# Patient Record
Sex: FEMALE | Race: WHITE | Employment: UNEMPLOYED | URBAN - METROPOLITAN AREA
[De-identification: names, ages, dates, MRNs, and addresses within clinical notes are randomized per-mention and may not be internally consistent; named-entity substitution may affect disease eponyms.]

---

## 2018-09-20 ENCOUNTER — HOSPITAL ENCOUNTER (EMERGENCY)
Age: 43
Discharge: HOME | End: 2018-09-20
Payer: COMMERCIAL

## 2018-09-20 ENCOUNTER — APPOINTMENT (OUTPATIENT)
Dept: CT IMAGING | Age: 43
End: 2018-09-20
Payer: COMMERCIAL

## 2018-09-20 VITALS
WEIGHT: 117 LBS | HEART RATE: 66 BPM | DIASTOLIC BLOOD PRESSURE: 80 MMHG | SYSTOLIC BLOOD PRESSURE: 132 MMHG | TEMPERATURE: 98.4 F | BODY MASS INDEX: 19.97 KG/M2 | OXYGEN SATURATION: 99 % | HEIGHT: 64 IN | RESPIRATION RATE: 16 BRPM

## 2018-09-20 DIAGNOSIS — S80.211A ABRASION, RIGHT KNEE, INITIAL ENCOUNTER: ICD-10-CM

## 2018-09-20 DIAGNOSIS — Z23 NEED FOR TETANUS BOOSTER: ICD-10-CM

## 2018-09-20 DIAGNOSIS — S50.01XA CONTUSION OF RIGHT ELBOW, INITIAL ENCOUNTER: ICD-10-CM

## 2018-09-20 DIAGNOSIS — S00.83XA FACIAL CONTUSION, INITIAL ENCOUNTER: Primary | ICD-10-CM

## 2018-09-20 PROCEDURE — 72125 CT NECK SPINE W/O DYE: CPT

## 2018-09-20 PROCEDURE — 6360000002 HC RX W HCPCS: Performed by: NURSE PRACTITIONER

## 2018-09-20 PROCEDURE — 70486 CT MAXILLOFACIAL W/O DYE: CPT

## 2018-09-20 PROCEDURE — 90715 TDAP VACCINE 7 YRS/> IM: CPT | Performed by: NURSE PRACTITIONER

## 2018-09-20 PROCEDURE — 70450 CT HEAD/BRAIN W/O DYE: CPT

## 2018-09-20 PROCEDURE — 90471 IMMUNIZATION ADMIN: CPT | Performed by: NURSE PRACTITIONER

## 2018-09-20 PROCEDURE — 99284 EMERGENCY DEPT VISIT MOD MDM: CPT

## 2018-09-20 RX ADMIN — TETANUS TOXOID, REDUCED DIPHTHERIA TOXOID AND ACELLULAR PERTUSSIS VACCINE, ADSORBED 0.5 ML: 5; 2.5; 8; 8; 2.5 SUSPENSION INTRAMUSCULAR at 13:20

## 2018-09-20 ASSESSMENT — PAIN DESCRIPTION - PAIN TYPE: TYPE: ACUTE PAIN

## 2018-09-20 ASSESSMENT — PAIN SCALES - GENERAL: PAINLEVEL_OUTOF10: 1

## 2018-09-20 NOTE — ED PROVIDER NOTES
corporate care. Discussed reasons for return. Counseling: The emergency provider has spoken with the patient and discussed todays results, in addition to providing specific details for the plan of care and counseling regarding the diagnosis and prognosis. Questions are answered at this time and they are agreeable with the plan. Assessment      1. Facial contusion, initial encounter    2. Contusion of right elbow, initial encounter    3. Abrasion, right knee, initial encounter    4. Need for tetanus booster      Plan   Discharge to home  Patient condition is stable    New Medications     New Prescriptions    No medications on file     Electronically signed by NATHALY Church CNP   DD: 9/20/18  **This report was transcribed using voice recognition software. Every effort was made to ensure accuracy; however, inadvertent computerized transcription errors may be present.   END OF ED PROVIDER NOTE       NATHALY Webster CNP  09/20/18 6000

## 2018-09-20 NOTE — ED TRIAGE NOTES
Pt reports to ER with an abrasion & swelling on chin, R elbow small abrasion, R knee abrasion. Pt reports she tripped & fell on a cement picnic table. No blood thinners, no LOC.    Pt is A&OX4, skin warm & dry, respirations even & unlabored

## 2019-03-15 ENCOUNTER — OFFICE VISIT (OUTPATIENT)
Dept: FAMILY MEDICINE CLINIC | Age: 44
End: 2019-03-15
Payer: COMMERCIAL

## 2019-03-15 VITALS
TEMPERATURE: 97.7 F | HEART RATE: 93 BPM | OXYGEN SATURATION: 98 % | WEIGHT: 112 LBS | RESPIRATION RATE: 18 BRPM | DIASTOLIC BLOOD PRESSURE: 70 MMHG | HEIGHT: 64 IN | BODY MASS INDEX: 19.12 KG/M2 | SYSTOLIC BLOOD PRESSURE: 112 MMHG

## 2019-03-15 DIAGNOSIS — J01.90 ACUTE SINUSITIS, RECURRENCE NOT SPECIFIED, UNSPECIFIED LOCATION: ICD-10-CM

## 2019-03-15 DIAGNOSIS — J40 BRONCHITIS: Primary | ICD-10-CM

## 2019-03-15 LAB
INFLUENZA A ANTIBODY: NEGATIVE
INFLUENZA B ANTIBODY: NEGATIVE

## 2019-03-15 PROCEDURE — 87804 INFLUENZA ASSAY W/OPTIC: CPT | Performed by: FAMILY MEDICINE

## 2019-03-15 PROCEDURE — 99213 OFFICE O/P EST LOW 20 MIN: CPT | Performed by: FAMILY MEDICINE

## 2019-03-15 RX ORDER — GUAIFENESIN/DEXTROMETHORPHAN 100-10MG/5
10 SYRUP ORAL 3 TIMES DAILY PRN
Qty: 240 ML | Refills: 3 | Status: SHIPPED | OUTPATIENT
Start: 2019-03-15 | End: 2019-03-25

## 2019-03-15 RX ORDER — FLUTICASONE FUROATE AND VILANTEROL 200; 25 UG/1; UG/1
200 POWDER RESPIRATORY (INHALATION) DAILY
Qty: 1 EACH | Refills: 0 | Status: SHIPPED | COMMUNITY
Start: 2019-03-15 | End: 2019-07-31 | Stop reason: ALTCHOICE

## 2019-03-15 RX ORDER — FLUTICASONE FUROATE AND VILANTEROL 200; 25 UG/1; UG/1
200 POWDER RESPIRATORY (INHALATION) DAILY
Qty: 1 EACH | Refills: 0 | Status: SHIPPED | OUTPATIENT
Start: 2019-03-15 | End: 2019-03-15 | Stop reason: DRUGHIGH

## 2019-03-15 RX ORDER — METRONIDAZOLE 500 MG/1
TABLET ORAL
Refills: 0 | COMMUNITY
Start: 2019-03-08 | End: 2019-07-31 | Stop reason: ALTCHOICE

## 2019-03-15 RX ORDER — FLUTICASONE PROPIONATE 50 MCG
1 SPRAY, SUSPENSION (ML) NASAL DAILY
Qty: 1 BOTTLE | Refills: 3 | Status: SHIPPED | OUTPATIENT
Start: 2019-03-15 | End: 2019-07-31 | Stop reason: ALTCHOICE

## 2019-03-15 RX ORDER — METHYLPREDNISOLONE 4 MG/1
TABLET ORAL
Qty: 1 KIT | Refills: 0 | Status: SHIPPED | OUTPATIENT
Start: 2019-03-15 | End: 2019-03-21

## 2019-03-15 ASSESSMENT — ENCOUNTER SYMPTOMS
COLOR CHANGE: 0
STRIDOR: 0
EYE DISCHARGE: 0
CHOKING: 0
EYE PAIN: 0
ABDOMINAL DISTENTION: 0
CHEST TIGHTNESS: 0
ALLERGIC/IMMUNOLOGIC NEGATIVE: 1
VOICE CHANGE: 0
TROUBLE SWALLOWING: 0
SWOLLEN GLANDS: 0
HOARSE VOICE: 0
SHORTNESS OF BREATH: 0
PHOTOPHOBIA: 0
APNEA: 0
EYE ITCHING: 0
BACK PAIN: 0
BLOOD IN STOOL: 0
RECTAL PAIN: 0
SORE THROAT: 1
COUGH: 1
ANAL BLEEDING: 0
SINUS PRESSURE: 1
CONSTIPATION: 0
GASTROINTESTINAL NEGATIVE: 1
EYE REDNESS: 0
FACIAL SWELLING: 0

## 2019-03-15 ASSESSMENT — PATIENT HEALTH QUESTIONNAIRE - PHQ9
1. LITTLE INTEREST OR PLEASURE IN DOING THINGS: 0
2. FEELING DOWN, DEPRESSED OR HOPELESS: 0
SUM OF ALL RESPONSES TO PHQ QUESTIONS 1-9: 0
SUM OF ALL RESPONSES TO PHQ9 QUESTIONS 1 & 2: 0
SUM OF ALL RESPONSES TO PHQ QUESTIONS 1-9: 0

## 2019-07-31 ENCOUNTER — HOSPITAL ENCOUNTER (OUTPATIENT)
Age: 44
Discharge: HOME | End: 2019-08-02
Payer: COMMERCIAL

## 2019-07-31 ENCOUNTER — OFFICE VISIT (OUTPATIENT)
Dept: FAMILY MEDICINE CLINIC | Age: 44
End: 2019-07-31

## 2019-07-31 VITALS
BODY MASS INDEX: 19.26 KG/M2 | DIASTOLIC BLOOD PRESSURE: 72 MMHG | TEMPERATURE: 97.4 F | OXYGEN SATURATION: 98 % | HEART RATE: 66 BPM | SYSTOLIC BLOOD PRESSURE: 110 MMHG | HEIGHT: 64 IN | WEIGHT: 112.8 LBS | RESPIRATION RATE: 18 BRPM

## 2019-07-31 DIAGNOSIS — R53.83 FATIGUE, UNSPECIFIED TYPE: ICD-10-CM

## 2019-07-31 DIAGNOSIS — R73.01 IFG (IMPAIRED FASTING GLUCOSE): ICD-10-CM

## 2019-07-31 DIAGNOSIS — E78.5 DYSLIPIDEMIA: ICD-10-CM

## 2019-07-31 DIAGNOSIS — J01.90 ACUTE BACTERIAL SINUSITIS: Primary | ICD-10-CM

## 2019-07-31 DIAGNOSIS — B96.89 ACUTE BACTERIAL SINUSITIS: Primary | ICD-10-CM

## 2019-07-31 DIAGNOSIS — B96.89 ACUTE BACTERIAL SINUSITIS: ICD-10-CM

## 2019-07-31 DIAGNOSIS — J01.90 ACUTE BACTERIAL SINUSITIS: ICD-10-CM

## 2019-07-31 DIAGNOSIS — Q30.9 NOSE ANOMALY: ICD-10-CM

## 2019-07-31 PROCEDURE — 87070 CULTURE OTHR SPECIMN AEROBIC: CPT

## 2019-07-31 PROCEDURE — 99213 OFFICE O/P EST LOW 20 MIN: CPT | Performed by: FAMILY MEDICINE

## 2019-07-31 RX ORDER — METHYLPREDNISOLONE 4 MG/1
TABLET ORAL
Qty: 1 KIT | Refills: 0 | Status: SHIPPED | OUTPATIENT
Start: 2019-07-31 | End: 2019-08-06

## 2019-07-31 RX ORDER — GUAIFENESIN 600 MG/1
600 TABLET, EXTENDED RELEASE ORAL 2 TIMES DAILY
Qty: 30 TABLET | Refills: 0 | Status: SHIPPED | OUTPATIENT
Start: 2019-07-31 | End: 2019-08-15

## 2019-07-31 RX ORDER — AMOXICILLIN AND CLAVULANATE POTASSIUM 875; 125 MG/1; MG/1
1 TABLET, FILM COATED ORAL 2 TIMES DAILY
Qty: 14 TABLET | Refills: 0 | Status: SHIPPED | OUTPATIENT
Start: 2019-07-31 | End: 2019-08-07

## 2019-07-31 ASSESSMENT — ENCOUNTER SYMPTOMS
COLOR CHANGE: 0
DIARRHEA: 0
TROUBLE SWALLOWING: 0
SINUS PRESSURE: 1
RHINORRHEA: 0
CHOKING: 0
EYE REDNESS: 0
BACK PAIN: 0
SHORTNESS OF BREATH: 0
FACIAL SWELLING: 0
ABDOMINAL PAIN: 0
ALLERGIC/IMMUNOLOGIC NEGATIVE: 1
VOICE CHANGE: 0
APNEA: 0
GASTROINTESTINAL NEGATIVE: 1
RECTAL PAIN: 0
WHEEZING: 0
ANAL BLEEDING: 0
PHOTOPHOBIA: 0
STRIDOR: 0
VOMITING: 0
EYE DISCHARGE: 0
COUGH: 0
CONSTIPATION: 0
CHEST TIGHTNESS: 0
NAUSEA: 0
ABDOMINAL DISTENTION: 0
EYE ITCHING: 0
BLOOD IN STOOL: 0
EYE PAIN: 0
SINUS PAIN: 1

## 2019-07-31 NOTE — PROGRESS NOTES
enuresis, flank pain, frequency, genital sores, hematuria, menstrual problem, pelvic pain, urgency, vaginal bleeding, vaginal discharge and vaginal pain. Musculoskeletal: Negative. Negative for arthralgias, back pain, gait problem, joint swelling, myalgias, neck pain and neck stiffness. Skin: Negative. Negative for color change, pallor, rash and wound. Allergic/Immunologic: Negative. Negative for environmental allergies, food allergies and immunocompromised state. Neurological: Negative. Negative for dizziness, tremors, seizures, syncope, facial asymmetry, speech difficulty, weakness, light-headedness, numbness and headaches. Hematological: Negative. Negative for adenopathy. Does not bruise/bleed easily. Psychiatric/Behavioral: Negative. Negative for agitation, behavioral problems, confusion, decreased concentration, dysphoric mood, hallucinations, self-injury, sleep disturbance and suicidal ideas. The patient is not nervous/anxious and is not hyperactive. Past Medical/Surgical Hx;  Reviewed with patient  History reviewed. No pertinent past medical history. Past Surgical History:   Procedure Laterality Date    BREAST LUMPECTOMY      BUNIONECTOMY         Past Family Hx:  Reviewed with patient  History reviewed. No pertinent family history. Social Hx:  Reviewed with patient  Social History     Tobacco Use    Smoking status: Never Smoker    Smokeless tobacco: Never Used   Substance Use Topics    Alcohol use: No       OBJECTIVE  /72   Pulse 66   Temp 97.4 °F (36.3 °C)   Resp 18   Ht 5' 3.5\" (1.613 m)   Wt 112 lb 12.8 oz (51.2 kg)   LMP 07/17/2019 (Approximate)   SpO2 98%   Breastfeeding? No   BMI 19.67 kg/m²     Problem List:  Jossy Razo does not have any pertinent problems on file. PHYS EX:  Physical Exam   Constitutional: She is oriented to person, place, and time. She appears well-developed and well-nourished. No distress. HENT:   Head: Normocephalic and atraumatic. Right Ear: External ear normal.   Left Ear: External ear normal.   Nose: Nose normal.   Mouth/Throat: Oropharynx is clear and moist. No oropharyngeal exudate. Sinus congestion with a post nasal drip  Swelling of her nasal bridge  Erythema to both nostrils , mucosa    Eyes: Pupils are equal, round, and reactive to light. Conjunctivae and EOM are normal. Right eye exhibits no discharge. Left eye exhibits no discharge. No scleral icterus. Neck: Normal range of motion. Neck supple. No JVD present. No tracheal deviation present. No thyromegaly present. Cardiovascular: Normal rate, regular rhythm and normal heart sounds. Exam reveals no gallop and no friction rub. No murmur heard. Pulmonary/Chest: Effort normal and breath sounds normal. No stridor. No respiratory distress. She has no wheezes. She has no rales. She exhibits no tenderness. Abdominal: Soft. Bowel sounds are normal. She exhibits no distension and no mass. There is no tenderness. There is no rebound and no guarding. No hernia. Musculoskeletal: Normal range of motion. She exhibits no edema, tenderness or deformity. Lymphadenopathy:     She has no cervical adenopathy. Neurological: She is alert and oriented to person, place, and time. She has normal reflexes. She displays normal reflexes. No cranial nerve deficit or sensory deficit. She exhibits normal muscle tone. Coordination normal.   Skin: Skin is warm. No rash noted. She is not diaphoretic. No erythema. No pallor. Psychiatric: She has a normal mood and affect. Her behavior is normal. Judgment and thought content normal.   Nursing note and vitals reviewed. ASSESSMENT/PLAN  Judd was seen today for facial swelling. Diagnoses and all orders for this visit:    Acute bacterial sinusitis  -     amoxicillin-clavulanate (AUGMENTIN) 875-125 MG per tablet; Take 1 tablet by mouth 2 times daily for 7 days  -     guaiFENesin (MUCINEX) 600 MG extended release tablet;  Take 1 tablet by mouth 2 times daily for 15 days  -     methylPREDNISolone (MEDROL DOSEPACK) 4 MG tablet; Take as directed  -     CT SINUS W WO CONTRAST; Future  PLAN---irrigate right and left sinus---Rx      Nose anomaly  -     amoxicillin-clavulanate (AUGMENTIN) 875-125 MG per tablet; Take 1 tablet by mouth 2 times daily for 7 days  -     guaiFENesin (MUCINEX) 600 MG extended release tablet; Take 1 tablet by mouth 2 times daily for 15 days  -     methylPREDNISolone (MEDROL DOSEPACK) 4 MG tablet; Take as directed  -     CT SINUS W WO CONTRAST; Future    IFG (impaired fasting glucose)  -     Comprehensive Metabolic Panel; Future  -     CBC Auto Differential; Future  -     Hemoglobin A1C; Future    Dyslipidemia  -     Comprehensive Metabolic Panel; Future  -     Lipid Panel; Future  -     CBC Auto Differential; Future    Fatigue, unspecified type  -     TSH without Reflex; Future  -     Comprehensive Metabolic Panel; Future  -     CBC Auto Differential; Future        Outpatient Encounter Medications as of 7/31/2019   Medication Sig Dispense Refill    amoxicillin-clavulanate (AUGMENTIN) 875-125 MG per tablet Take 1 tablet by mouth 2 times daily for 7 days 14 tablet 0    guaiFENesin (MUCINEX) 600 MG extended release tablet Take 1 tablet by mouth 2 times daily for 15 days 30 tablet 0    methylPREDNISolone (MEDROL DOSEPACK) 4 MG tablet Take as directed 1 kit 0    [DISCONTINUED] metroNIDAZOLE (FLAGYL) 500 MG tablet TAKE ONE TABLET BY MOUTH TWICE DAILY UNTIL FINISHED  0    [DISCONTINUED] fluticasone (FLONASE) 50 MCG/ACT nasal spray 1 spray by Nasal route daily 1 Bottle 3    [DISCONTINUED] Fluticasone Furoate-Vilanterol (BREO ELLIPTA) 200-25 MCG/INH AEPB Inhale 200 mcg into the lungs daily 1 each 0     No facility-administered encounter medications on file as of 7/31/2019. Return in about 2 weeks (around 8/14/2019).         Reviewed recent labs related to Jeanne's current problems      Discussed importance of regular Health Maintenance

## 2019-08-02 ASSESSMENT — ENCOUNTER SYMPTOMS
SORE THROAT: 0
SWOLLEN GLANDS: 0
HOARSE VOICE: 0

## 2019-08-03 LAB — CULTURE NOSE: NORMAL

## 2019-09-17 DIAGNOSIS — Z20.2 VENEREAL DISEASE CONTACT: ICD-10-CM

## 2019-09-18 ENCOUNTER — HOSPITAL ENCOUNTER (OUTPATIENT)
Dept: INFUSION THERAPY | Age: 44
Setting detail: INFUSION SERIES
Discharge: HOME | End: 2019-09-18
Payer: COMMERCIAL

## 2019-09-18 DIAGNOSIS — Z20.2 VENEREAL DISEASE CONTACT: Primary | ICD-10-CM

## 2019-09-18 PROCEDURE — 96372 THER/PROPH/DIAG INJ SC/IM: CPT

## 2019-09-18 PROCEDURE — 2500000003 HC RX 250 WO HCPCS: Performed by: SPECIALIST

## 2019-09-18 PROCEDURE — 6360000002 HC RX W HCPCS: Performed by: SPECIALIST

## 2019-09-18 RX ADMIN — LIDOCAINE HYDROCHLORIDE 250 MG: 10 INJECTION, SOLUTION EPIDURAL; INFILTRATION; INTRACAUDAL; PERINEURAL at 13:59

## 2020-04-21 ENCOUNTER — HOSPITAL ENCOUNTER (EMERGENCY)
Age: 45
Discharge: HOME | End: 2020-04-21

## 2020-04-21 ENCOUNTER — APPOINTMENT (OUTPATIENT)
Dept: GENERAL RADIOLOGY | Age: 45
End: 2020-04-21

## 2020-04-21 VITALS
WEIGHT: 106 LBS | HEART RATE: 69 BPM | BODY MASS INDEX: 18.78 KG/M2 | SYSTOLIC BLOOD PRESSURE: 118 MMHG | TEMPERATURE: 98.5 F | HEIGHT: 63 IN | OXYGEN SATURATION: 100 % | DIASTOLIC BLOOD PRESSURE: 76 MMHG | RESPIRATION RATE: 16 BRPM

## 2020-04-21 PROCEDURE — 99283 EMERGENCY DEPT VISIT LOW MDM: CPT

## 2020-04-21 PROCEDURE — 73564 X-RAY EXAM KNEE 4 OR MORE: CPT

## 2020-04-21 RX ORDER — NAPROXEN 500 MG/1
500 TABLET ORAL 2 TIMES DAILY PRN
Qty: 14 TABLET | Refills: 0 | Status: SHIPPED | OUTPATIENT
Start: 2020-04-21 | End: 2020-04-23

## 2020-04-21 ASSESSMENT — PAIN DESCRIPTION - LOCATION: LOCATION: KNEE

## 2020-04-21 ASSESSMENT — PAIN DESCRIPTION - DESCRIPTORS: DESCRIPTORS: DISCOMFORT

## 2020-04-21 ASSESSMENT — PAIN SCALES - GENERAL: PAINLEVEL_OUTOF10: 7

## 2020-04-21 ASSESSMENT — PAIN DESCRIPTION - ORIENTATION: ORIENTATION: LEFT

## 2020-04-21 ASSESSMENT — PAIN DESCRIPTION - PAIN TYPE: TYPE: ACUTE PAIN

## 2020-04-21 NOTE — ED NOTES
Ace wrap applied to patients left knee. Pt tolerated well.  Patient given crutches at this time     Tamy Saini RN  04/21/20 0978

## 2020-04-21 NOTE — ED PROVIDER NOTES
Alert, development consistent with age. Neck:  Normal ROM. Supple. Knee:  Left medial:             Tenderness:  moderate. .              Swelling/Effusion: None. Deformity: no.              ROM: full range of motion. Skin:  no erythema, rash or wounds noted. Drawer's:  Negative. Lachman's: Negative. Apley's: Not Tested. Robina's: Negative. Valgus/Varus Stress: Negative. Crepitus: Negative. Hip:            Tenderness:  none. Swelling: None. Deformity: no.              ROM: full range of motion. Skin:  no erythema, rash or wounds noted. Joint(s) Below: ankle. Tenderness:  none. Swelling: No.              Deformity: no.             ROM: full range of motion. Skin:  no erythema, rash or wounds noted. Neurovascular: Motor deficit: none; full ROM with no laxity. Sensory deficit: none; full sensation intact to light touch in distal toes. Pulse deficit: none; 2 + pedal and posterior tibial pulses intact. Capillary refill: normal; less than 3 seconds in distal toes. Gait:  limp. Lymphatics: No lymphangitis or adenopathy noted. Neurological:  Oriented. Motor functions intact. Lab / Imaging Results   (All laboratory and radiology results have been personally reviewed by myself)  Labs:  No results found for this visit on 04/21/20. Imaging: All Radiology results interpreted by Radiologist unless otherwise noted. XR KNEE LEFT (MIN 4 VIEWS)   Final Result      Normal left knee radiographs. ED Course / Medical Decision Making   Medications - No data to display     Consult(s):   None    Procedure(s):   none. Medical Decision Making:    Films were obtained based on positive suspicion for bony injury as per history/physical findings .  Plan is subsequently for symptom control, limited use and  immobilization

## 2020-04-23 ENCOUNTER — VIRTUAL VISIT (OUTPATIENT)
Dept: FAMILY MEDICINE CLINIC | Age: 45
End: 2020-04-23

## 2020-04-23 PROBLEM — S83.92XA SPRAIN OF LEFT KNEE: Status: ACTIVE | Noted: 2020-04-23

## 2020-04-23 PROCEDURE — 99213 OFFICE O/P EST LOW 20 MIN: CPT | Performed by: FAMILY MEDICINE

## 2020-04-23 RX ORDER — METHYLPREDNISOLONE 4 MG/1
TABLET ORAL
Qty: 1 KIT | Refills: 0 | Status: SHIPPED
Start: 2020-04-23 | End: 2020-04-28

## 2020-04-23 ASSESSMENT — PATIENT HEALTH QUESTIONNAIRE - PHQ9
SUM OF ALL RESPONSES TO PHQ QUESTIONS 1-9: 0
SUM OF ALL RESPONSES TO PHQ QUESTIONS 1-9: 0
1. LITTLE INTEREST OR PLEASURE IN DOING THINGS: 0
2. FEELING DOWN, DEPRESSED OR HOPELESS: 0
SUM OF ALL RESPONSES TO PHQ9 QUESTIONS 1 & 2: 0

## 2020-04-23 NOTE — PROGRESS NOTES
TeleMedicine Patient Consent    This visit was performed as a virtual video visit using a synchronous, two-way, audio-video telehealth technology platform. Patient identification was verified at the start of the visit, including the patient's telephone number and physical location. I discussed with the patient the nature of our telehealth visits, that:     1. Due to the nature of an audio- video modality, the only components of a physical exam that could be done are the elements supported by direct observation. 2. I would evaluate the patient and recommend diagnostics and treatments based on my assessment. 3. If it was felt that the patient should be evaluated in clinic or an emergency room setting, then they would be directed there. 4. Our sessions are not being recorded and that personal health information is protected. 5. Our team would provide follow up care in person if/when the patient needs it. Patient does agree to proceed with telemedicine consultation. Patient's location: home address in [facility]  Physician  location address in [facility] other people involved in call none. [address]        SUBJECTIVE:  CC: had concerns including ED Follow-up (Pt c/o L knee pain; pt states that she twisted it ) and Other (Pt verbally agreed to telemedicine consult; calling from home in [facility] Yomi Suzette presented to the clinic for a routine visit. Mark Rey is a 54year old female presenting for a virtual visit today. She was seen in the ED 4/21 after twisting her left knee. She reports that her left knee buckled and gave out. She notes her left knee stood still while the rest of her body moved. XR in the ER was negative for an acute fracture. She is wearing a knee brace and crutches to walk. She is keeping her leg elevated and using the Naproxen provided by the ED. She notes a pulling/burning sensation underneath her knee cap and on the medial side of her left knee.  She feels like the pain is worse since her initial fall and she reports some swelling as well. Left Knee XR 4/21  Impression       Normal left knee radiographs.           Review of Systems   Constitutional: Negative for appetite change, fatigue and fever. Respiratory: Negative for cough, shortness of breath and wheezing. Cardiovascular: Negative for chest pain and palpitations. Gastrointestinal: Negative for abdominal pain, constipation, diarrhea, nausea and vomiting. Musculoskeletal: Positive for arthralgias, gait problem and joint swelling. Negative for back pain. Outpatient Medications Marked as Taking for the 4/23/20 encounter (Virtual Visit) with Luis Dye MD   Medication Sig Dispense Refill    methylPREDNISolone (MEDROL DOSEPACK) 4 MG tablet Take by mouth as directed see kit 1 kit 0       I have reviewed all pertinent PMHx, PSHx, FamHx, SocialHx, medications, and allergies and updated history as appropriate. OBJECTIVE    VS: There were no vitals taken for this visit. Physical Exam  Constitutional:       Appearance: Normal appearance. She is not ill-appearing. Neurological:      Mental Status: She is oriented to person, place, and time. Psychiatric:         Mood and Affect: Mood normal.         Behavior: Behavior normal.         ASSESSMENT/PLAN:  1. Sprain of left knee, unspecified ligament, subsequent encounter  Continue RICE with prn otc analgesic and steroid burst. Patient given exercises for a knee sprain. If symptoms do not improve over the next week, advise patient we need an MRI to r/o meniscal or ligamental tear. - MRI KNEE LEFT WO CONTRAST; Future  - methylPREDNISolone (MEDROL DOSEPACK) 4 MG tablet; Take by mouth as directed see kit  Dispense: 1 kit;  Refill: 0      I have reviewed my findings and recommendations with Alexandre Park MD  4/28/2020 2:36 PM     Counseled regarding above diagnosis, including possible risks and complications, especially if left uncontrolled. Patient counseled on red flag symptoms and if they occur to go to the ED. Discussed medications risk/benefits and possible side effects and alternatives to treatment. Patient and/or guardian verbalizes understanding, agrees, feels comfortable with and wishes to proceed with above treatment plan. Advised patient regarding importance of keeping up with recommended health maintenance and to schedule as soon as possible if overdue, as this is important in assessing for undiagnosed pathology, especially cancer, as well as protecting against potentially harmful/life threatening disease. Patient and/or guardian verbalizes understanding and agrees with above counseling, assessment and plan. All questions answered. Please note this report has been partially produced using speech recognition software  and may contain errors related to that system including grammar, punctuation and spelling as well as words and phrases that may seem inappropriate. If there are questions or concerns please feel free to contact me to clarify. Time spent: Greater than Not billed by time    This visit was completed virtually using D[employer]. me

## 2020-04-23 NOTE — PATIENT INSTRUCTIONS
directed. · Ask your doctor if you can take an over-the-counter pain medicine, such as acetaminophen (Tylenol), ibuprofen (Advil, Motrin), or naproxen (Aleve). Be safe with medicines. Read and follow all instructions on the label. When should you call for help? Call 911 anytime you think you may need emergency care. For example, call if:    · You have sudden chest pain and shortness of breath, or you cough up blood.    Call your doctor now or seek immediate medical care if:    · You have increased or severe pain.     · You cannot move your toes or ankle.     · Your foot is cool or pale or changes color.     · You have tingling, weakness, or numbness in your foot or leg.     · Your splint or brace feels too tight.     · You are unable to straighten the knee, or the knee \"locks. \"     · You have signs of a blood clot in your leg, such as:  ? Pain in your calf, back of the knee, thigh, or groin. ? Redness and swelling in your leg.    Watch closely for changes in your health, and be sure to contact your doctor if:    · Your pain is not getting better or is getting worse. Where can you learn more? Go to https://StatSocial.Prior Knowledge. org and sign in to your [employer] account. Enter N406 in the [employer] box to learn more about \"Knee Sprain: Care Instructions. \"     If you do not have an account, please click on the \"Sign Up Now\" link. Current as of: June 26, 2019Content Version: 12.4  © 6173-7393 Healthwise, Incorporated. Care instructions adapted under license by [facility] ([facility]). If you have questions about a medical condition or this instruction, always ask your healthcare professional. [place] [address] any warranty or liability for your use of this information. Patient Education        Knee Sprain: Care Instructions  Your Care Instructions    A knee sprain is one or more stretched, partly torn, or completely torn knee ligaments.  Ligaments are bands of ropelike tissue that instructions on the label. When should you call for help? Call 911 anytime you think you may need emergency care. For example, call if:    · You have sudden chest pain and shortness of breath, or you cough up blood.    Call your doctor now or seek immediate medical care if:    · You have increased or severe pain.     · You cannot move your toes or ankle.     · Your foot is cool or pale or changes color.     · You have tingling, weakness, or numbness in your foot or leg.     · Your splint or brace feels too tight.     · You are unable to straighten the knee, or the knee \"locks. \"     · You have signs of a blood clot in your leg, such as:  ? Pain in your calf, back of the knee, thigh, or groin. ? Redness and swelling in your leg.    Watch closely for changes in your health, and be sure to contact your doctor if:    · Your pain is not getting better or is getting worse. Where can you learn more? Go to https://[employer].PlaceSpeak. org and sign in to your [employer] account. Enter N406 in the [employer] box to learn more about \"Knee Sprain: Care Instructions. \"     If you do not have an account, please click on the \"Sign Up Now\" link. Current as of: June 26, 2019Content Version: 12.4  © 9693-6025 Healthwise, Incorporated. Care instructions adapted under license by [facility] ([facility]). If you have questions about a medical condition or this instruction, always ask your healthcare professional. [place] [address] any warranty or liability for your use of this information.

## 2020-04-28 ENCOUNTER — HOSPITAL ENCOUNTER (EMERGENCY)
Age: 45
Discharge: HOME | End: 2020-04-28

## 2020-04-28 VITALS
HEIGHT: 63 IN | HEART RATE: 93 BPM | WEIGHT: 108 LBS | DIASTOLIC BLOOD PRESSURE: 72 MMHG | RESPIRATION RATE: 18 BRPM | TEMPERATURE: 98.6 F | OXYGEN SATURATION: 97 % | BODY MASS INDEX: 19.14 KG/M2 | SYSTOLIC BLOOD PRESSURE: 126 MMHG

## 2020-04-28 LAB
HCG(URINE) PREGNANCY TEST: NEGATIVE
STREP GRP A PCR: NEGATIVE

## 2020-04-28 PROCEDURE — 87880 STREP A ASSAY W/OPTIC: CPT

## 2020-04-28 PROCEDURE — 99283 EMERGENCY DEPT VISIT LOW MDM: CPT

## 2020-04-28 PROCEDURE — 99281 EMR DPT VST MAYX REQ PHY/QHP: CPT

## 2020-04-28 PROCEDURE — 81025 URINE PREGNANCY TEST: CPT

## 2020-04-28 ASSESSMENT — ENCOUNTER SYMPTOMS
CONSTIPATION: 0
DIARRHEA: 0
SHORTNESS OF BREATH: 0
SHORTNESS OF BREATH: 0
WHEEZING: 0
ABDOMINAL PAIN: 0
BACK PAIN: 0
SINUS PAIN: 0
COUGH: 0
NAUSEA: 0
CHEST TIGHTNESS: 0
BACK PAIN: 0
VOMITING: 0
SORE THROAT: 0
VOMITING: 0
DIARRHEA: 0
ABDOMINAL PAIN: 0
NAUSEA: 0
COUGH: 0

## 2020-04-28 NOTE — ED PROVIDER NOTES
This is a 69-year-old female with no significant past medical history presenting with concerns that she might have strep throat. She believes her boyfriend has strep throat. She denies any other sick contacts. She has no fever. No trouble breathing. No chest pain. In fact, she has no sore throat, she is just concerned she might have strep because she is worried her boyfriend does. The history is provided by the patient. No  was used. Review of Systems   Constitutional: Negative for activity change, chills, fatigue and fever. HENT: Negative for congestion, sinus pain and sore throat. Eyes: Negative for visual disturbance. Respiratory: Negative for cough, chest tightness and shortness of breath. Cardiovascular: Negative for chest pain, palpitations and leg swelling. Gastrointestinal: Negative for abdominal pain, diarrhea, nausea and vomiting. Genitourinary: Negative for dysuria and urgency. Musculoskeletal: Negative for back pain, neck pain and neck stiffness. Skin: Negative for rash. Neurological: Negative for dizziness, syncope and headaches. Psychiatric/Behavioral: Negative for confusion. Physical Exam  Vitals signs and nursing note reviewed. Constitutional:       General: She is not in acute distress. Appearance: She is well-developed. She is not diaphoretic. HENT:      Head: Normocephalic and atraumatic. Nose: Nose normal.      Mouth/Throat:      Mouth: Mucous membranes are moist.      Pharynx: No oropharyngeal exudate or posterior oropharyngeal erythema. Comments: No uvular enlargement or deviation  No erythema, no lesion      Previous t and a  Eyes:      Extraocular Movements: Extraocular movements intact. Conjunctiva/sclera: Conjunctivae normal.      Pupils: Pupils are equal, round, and reactive to light. Neck:      Musculoskeletal: Normal range of motion and neck supple.    Cardiovascular:      Rate and Rhythm: Normal

## 2020-04-30 ENCOUNTER — TELEPHONE (OUTPATIENT)
Dept: PRIMARY CARE CLINIC | Age: 45
End: 2020-04-30

## 2020-05-01 NOTE — TELEPHONE ENCOUNTER
Second attempt to call the patient for ED follow up. There was no answer. Left message for the patient, if their symptoms worsened or persists, they were instructed to call their PCP or return to the ED for evaluation.      Cassandra Zepeda RN, [employer]

## 2020-10-02 ENCOUNTER — HOSPITAL ENCOUNTER (EMERGENCY)
Age: 45
Discharge: HOME | End: 2020-10-02
Attending: EMERGENCY MEDICINE

## 2020-10-02 VITALS
TEMPERATURE: 97.3 F | HEART RATE: 87 BPM | BODY MASS INDEX: 20.02 KG/M2 | RESPIRATION RATE: 16 BRPM | OXYGEN SATURATION: 98 % | SYSTOLIC BLOOD PRESSURE: 144 MMHG | HEIGHT: 63 IN | DIASTOLIC BLOOD PRESSURE: 94 MMHG | WEIGHT: 113 LBS

## 2020-10-02 LAB
BACTERIA: ABNORMAL /HPF
BILIRUBIN URINE: NEGATIVE
BLOOD, URINE: ABNORMAL
CLARITY: CLEAR
CLUE CELLS: NORMAL
COLOR: YELLOW
GLUCOSE URINE: NEGATIVE MG/DL
HCG(URINE) PREGNANCY TEST: NEGATIVE
KETONES, URINE: NEGATIVE MG/DL
LEUKOCYTE ESTERASE, URINE: ABNORMAL
NITRITE, URINE: NEGATIVE
PH UA: 7 (ref 5–9)
PROTEIN UA: NEGATIVE MG/DL
RBC UA: ABNORMAL /HPF (ref 0–2)
SOURCE WET PREP: NORMAL
SPECIFIC GRAVITY UA: <=1.005 (ref 1–1.03)
TRICHOMONAS PREP: NORMAL
UROBILINOGEN, URINE: 1 E.U./DL
WBC UA: ABNORMAL /HPF (ref 0–5)
YEAST WET PREP: NORMAL

## 2020-10-02 PROCEDURE — 99282 EMERGENCY DEPT VISIT SF MDM: CPT

## 2020-10-02 PROCEDURE — 87491 CHLMYD TRACH DNA AMP PROBE: CPT

## 2020-10-02 PROCEDURE — 87210 SMEAR WET MOUNT SALINE/INK: CPT

## 2020-10-02 PROCEDURE — 87591 N.GONORRHOEAE DNA AMP PROB: CPT

## 2020-10-02 PROCEDURE — 81001 URINALYSIS AUTO W/SCOPE: CPT

## 2020-10-02 PROCEDURE — 81025 URINE PREGNANCY TEST: CPT

## 2020-10-03 NOTE — ED PROVIDER NOTES
HPI:  10/2/20, Time: 11:30 PM EDT         Nasra Greenfield is a 54 y.o. female presenting to the ED for STD check. She states today she found out that her significant other has been cheating on her with 7 other women. She is not sure if he has gonorrhea or chlamydia but she would like to be checked. She has no vaginal discharge, itching, bleeding, dysuria or hematuria but she wants checked. She denies genital lesions. The complaint has been persistent, moderate in severity, and worsened by nothing. Patient denies any other complaints. ROS:   Pertinent positives and negatives are stated within HPI, all other systems reviewed and are negative.      --------------------------------------------- PAST HISTORY ---------------------------------------------  Past Medical History:  has no past medical history on file. Past Surgical History:  has a past surgical history that includes Bunionectomy and Breast lumpectomy. Social History:  reports that she has never smoked. She has never used smokeless tobacco. She reports that she does not drink alcohol or use drugs. Family History: family history is not on file. The patients home medications have been reviewed. Allergies: Potassium-containing compounds        ---------------------------------------------------PHYSICAL EXAM--------------------------------------    Constitutional:  Well developed, well nourished, no acute distress, non-toxic appearance   Eyes:  PERRL, conjunctiva normal, EOMI  HENT:  Atraumatic, external ears normal, nose normal, oropharynx moist. Neck- normal range of motion, no nuchal rigidity  Respiratory:  No respiratory distress   Cardiovascular:  Normal rate, normal rhythm. Radial and DP pulses 2+ bilaterally. GI:  Soft, nondistended, normal bowel sounds, nontender, no rebound, no guarding   :  No costovertebral angle tenderness   Pelvic: No genital lesions.   No cervical motion tenderness, no adnexal tenderness, no adnexal masses. No bleeding. No discharge. Musculoskeletal:  No edema,  no deformities. Integument:  Well hydrated, no rash. Adequate perfusion. Lymphatic:  No inguinal lymphadenopathy noted   Neurologic:  Alert & oriented x 3, CN 2-12 normal, normal gait, no focal deficits noted. Psychiatric:  Speech and behavior appropriate       -------------------------------------------------- RESULTS -------------------------------------------------  I have personally reviewed all laboratory and imaging results for this patient. Results are listed below. LABS:  Results for orders placed or performed during the hospital encounter of 10/02/20   Wet prep, genital    Specimen: Vaginal   Result Value Ref Range    Trichomonas Prep None Seen     Yeast, Wet Prep None Seen     Clue Cells, Wet Prep None Seen     Source Wet Prep VAGINAL    C.trachomatis N.gonorrhoeae DNA   Result Value Ref Range    Source Cervix    Urinalysis with Microscopic   Result Value Ref Range    Color, UA Yellow Straw/Yellow    Clarity, UA Clear Clear    Glucose, Ur Negative Negative mg/dL    Bilirubin Urine Negative Negative    Ketones, Urine Negative Negative mg/dL    Specific Gravity, UA <=1.005 1.005 - 1.030    Blood, Urine TRACE-INTACT Negative    pH, UA 7.0 5.0 - 9.0    Protein, UA Negative Negative mg/dL    Urobilinogen, Urine 1.0 <2.0 E.U./dL    Nitrite, Urine Negative Negative    Leukocyte Esterase, Urine LARGE (A) Negative    WBC, UA 5-10 (A) 0 - 5 /HPF    RBC, UA 0-1 0 - 2 /HPF    Bacteria, UA RARE (A) None Seen /HPF   Pregnancy, Urine   Result Value Ref Range    HCG(Urine) Pregnancy Test NEGATIVE NEGATIVE       RADIOLOGY:  Interpreted by Radiologist.  No orders to display       ------------------------- NURSING NOTES AND VITALS REVIEWED ---------------------------   The nursing notes within the ED encounter and vital signs as below have been reviewed by myself.   BP (!) 144/94   Pulse 87   Temp 97.3 °F (36.3 °C) (Infrared)   Resp 16   Ht 5' 3\" (1.6 m)   Wt 113 lb (51.3 kg)   LMP 09/30/2020   SpO2 98%   BMI 20.02 kg/m²   Oxygen Saturation Interpretation: Normal    The patients available past medical records and past encounters were reviewed. ------------------------------ ED COURSE/MEDICAL DECISION MAKING----------------------  Medications - No data to display        Procedures:  none      Doesn't want treatment. Wants tested first. Then called. IF positive, will come back      Counseling: The emergency provider has spoken with the patient and discussed todays results, in addition to providing specific details for the plan of care and counseling regarding the diagnosis and prognosis. Questions are answered at this time and they are agreeable with the plan.       --------------------------------- IMPRESSION AND DISPOSITION ---------------------------------    IMPRESSION  1. Encounter for assessment of STD exposure    2.  Elevated blood pressure reading        DISPOSITION  Disposition: Discharge to home  Patient condition is stable                  Bijan Willis DO  10/03/20 0487

## 2020-10-08 LAB
C TRACH DNA GENITAL QL NAA+PROBE: ABNORMAL
N. GONORRHOEAE DNA: ABNORMAL
SOURCE: ABNORMAL

## 2020-10-08 RX ORDER — AZITHROMYCIN 500 MG/1
2000 TABLET, FILM COATED ORAL ONCE
Qty: 4 TABLET | Refills: 0 | Status: SHIPPED | OUTPATIENT
Start: 2020-10-08 | End: 2020-10-08

## 2020-12-24 ENCOUNTER — HOSPITAL ENCOUNTER (EMERGENCY)
Age: 45
Discharge: HOME | End: 2020-12-24
Attending: EMERGENCY MEDICINE

## 2020-12-24 VITALS
OXYGEN SATURATION: 100 % | HEIGHT: 64 IN | DIASTOLIC BLOOD PRESSURE: 84 MMHG | SYSTOLIC BLOOD PRESSURE: 136 MMHG | WEIGHT: 120 LBS | TEMPERATURE: 97.7 F | RESPIRATION RATE: 16 BRPM | HEART RATE: 71 BPM | BODY MASS INDEX: 20.49 KG/M2

## 2020-12-24 PROCEDURE — 6370000000 HC RX 637 (ALT 250 FOR IP)

## 2020-12-24 PROCEDURE — 6360000002 HC RX W HCPCS

## 2020-12-24 PROCEDURE — 99283 EMERGENCY DEPT VISIT LOW MDM: CPT

## 2020-12-24 RX ORDER — METRONIDAZOLE 500 MG/1
500 TABLET ORAL 3 TIMES DAILY
COMMUNITY
End: 2020-12-24

## 2020-12-24 RX ORDER — DEXAMETHASONE SODIUM PHOSPHATE 10 MG/ML
INJECTION INTRAMUSCULAR; INTRAVENOUS
Status: COMPLETED
Start: 2020-12-24 | End: 2020-12-24

## 2020-12-24 RX ORDER — PREDNISONE 20 MG/1
60 TABLET ORAL ONCE
Status: DISCONTINUED | OUTPATIENT
Start: 2020-12-24 | End: 2020-12-24

## 2020-12-24 RX ORDER — DEXAMETHASONE SODIUM PHOSPHATE 10 MG/ML
10 INJECTION, SOLUTION INTRAMUSCULAR; INTRAVENOUS ONCE
Status: DISCONTINUED | OUTPATIENT
Start: 2020-12-24 | End: 2020-12-24 | Stop reason: HOSPADM

## 2020-12-24 RX ORDER — CETIRIZINE HYDROCHLORIDE 10 MG/1
TABLET ORAL
Status: COMPLETED
Start: 2020-12-24 | End: 2020-12-24

## 2020-12-24 RX ORDER — PREDNISONE 20 MG/1
40 TABLET ORAL DAILY
Qty: 10 TABLET | Refills: 0 | Status: SHIPPED | OUTPATIENT
Start: 2020-12-24 | End: 2020-12-29

## 2020-12-24 RX ORDER — CETIRIZINE HYDROCHLORIDE 10 MG/1
10 TABLET ORAL ONCE
Status: COMPLETED | OUTPATIENT
Start: 2020-12-24 | End: 2020-12-24

## 2020-12-24 RX ADMIN — DEXAMETHASONE SODIUM PHOSPHATE 10 MG: 10 INJECTION INTRAMUSCULAR; INTRAVENOUS at 07:51

## 2020-12-24 RX ADMIN — CETIRIZINE HYDROCHLORIDE 10 MG: 10 TABLET, FILM COATED ORAL at 07:50

## 2020-12-24 RX ADMIN — CETIRIZINE HYDROCHLORIDE 10 MG: 10 TABLET ORAL at 07:50

## 2020-12-24 NOTE — ED PROVIDER NOTES
social history, and family history    ---------------------------------------------------PHYSICAL EXAM--------------------------------------    Constitutional/General: Alert and oriented x3  Head: Normocephalic and atraumatic  Eyes: PERRL, EOMI, sclera non icteric  Mouth: Oropharynx clear, handling secretions, no angioedema  Neck: Supple, full ROM, no stridor, no meningeal signs  Respiratory: Lungs clear to auscultation bilaterally, no wheezes, rales, or rhonchi. Not in respiratory distress  Cardiovascular:  Regular rate. Regular rhythm. No murmurs,no gallops, no rubs. 2+ distal pulses. Equal extremity pulses. Musculoskeletal: Moves all extremities x 4. Warm and well perfused, no clubbing, no cyanosis, no edema. Capillary refill <3 seconds  Skin: skin warm and dry. The patient has a raised urticarial rash that blanches along her legs, and abdomen and flanks. No petechia or purpura. The rash is nontender. No bulla or blisters. Neurologic: GCS 15      -------------------------------------------------- RESULTS -------------------------------------------------  I have personally reviewed all laboratory and imaging results for this patient. Results are listed below. ------------------------- NURSING NOTES AND VITALS REVIEWED ---------------------------   The nursing notes within the ED encounter and vital signs as below have been reviewed by myself  /84   Pulse 71   Temp 97.7 °F (36.5 °C) (Infrared)   Resp 16   Ht 5' 4\" (1.626 m)   Wt 120 lb (54.4 kg)   LMP 12/15/2020   SpO2 100%   BMI 20.60 kg/m²     Oxygen Saturation Interpretation: Normal    The patients available past medical records and past encounters were reviewed.         ------------------------------ ED COURSE/MEDICAL DECISION MAKING----------------------  Medications   dexamethasone (PF) (DECADRON) injection 10 mg (has no administration in time range)   dexamethasone (DECADRON) 10 MG/ML injection (10 mg  Given 12/24/20 0751) cetirizine (ZYRTEC) tablet 10 mg (10 mg Oral Given 12/24/20 7300)             I, Dr. Pernell Jimenez, am the primary provider of record    Medical Decision Making:   Allergic reaction, no signs of anaphylaxis. Steroids and antihistamines given. She says she has an intolerance to Benadryl so Zyrtec was given. Oxygen Saturation Interpretation: 100 % on RA. Normal           Counseling: The emergency provider has spoken with the patient and discussed todays results, in addition to providing specific details for the plan of care and counseling regarding the diagnosis and prognosis. Questions are answered at this time and they are agreeable with the plan.       --------------------------------- IMPRESSION AND DISPOSITION ---------------------------------    IMPRESSION  1. Allergic reaction, initial encounter        DISPOSITION  Disposition: Discharge to home  Patient condition is good        NOTE: This report was transcribed using voice recognition software.  Every effort was made to ensure accuracy; however, inadvertent computerized transcription errors may be present       Jose R Schuler MD  12/24/20 8111

## 2021-06-17 ENCOUNTER — HOSPITAL ENCOUNTER (EMERGENCY)
Age: 46
Discharge: HOME | End: 2021-06-17
Attending: EMERGENCY MEDICINE

## 2021-06-17 VITALS
RESPIRATION RATE: 16 BRPM | HEIGHT: 63 IN | TEMPERATURE: 98.2 F | WEIGHT: 128 LBS | HEART RATE: 80 BPM | BODY MASS INDEX: 22.68 KG/M2 | DIASTOLIC BLOOD PRESSURE: 74 MMHG | SYSTOLIC BLOOD PRESSURE: 128 MMHG | OXYGEN SATURATION: 100 %

## 2021-06-17 DIAGNOSIS — L50.9 URTICARIA: Primary | ICD-10-CM

## 2021-06-17 PROCEDURE — 6360000002 HC RX W HCPCS: Performed by: EMERGENCY MEDICINE

## 2021-06-17 PROCEDURE — 96372 THER/PROPH/DIAG INJ SC/IM: CPT

## 2021-06-17 PROCEDURE — 99284 EMERGENCY DEPT VISIT MOD MDM: CPT

## 2021-06-17 RX ORDER — HYDROXYZINE HYDROCHLORIDE 25 MG/1
25 TABLET, FILM COATED ORAL EVERY 6 HOURS PRN
Qty: 30 TABLET | Refills: 0 | Status: SHIPPED | OUTPATIENT
Start: 2021-06-17 | End: 2021-06-27

## 2021-06-17 RX ORDER — METHYLPREDNISOLONE 4 MG/1
TABLET ORAL
Qty: 1 KIT | Status: SHIPPED | OUTPATIENT
Start: 2021-06-17 | End: 2021-06-23

## 2021-06-17 RX ORDER — EPINEPHRINE 0.3 MG/.3ML
0.3 INJECTION SUBCUTANEOUS
Qty: 1 EACH | Refills: 0 | Status: ON HOLD | OUTPATIENT
Start: 2021-06-17 | End: 2022-07-29

## 2021-06-17 RX ORDER — HYDROXYZINE PAMOATE 25 MG/1
25 CAPSULE ORAL ONCE
Status: DISCONTINUED | OUTPATIENT
Start: 2021-06-17 | End: 2021-06-17

## 2021-06-17 RX ORDER — HYDROXYZINE HYDROCHLORIDE 50 MG/ML
25 INJECTION, SOLUTION INTRAMUSCULAR ONCE
Status: COMPLETED | OUTPATIENT
Start: 2021-06-17 | End: 2021-06-17

## 2021-06-17 RX ORDER — TRIAMCINOLONE ACETONIDE 40 MG/ML
60 INJECTION, SUSPENSION INTRA-ARTICULAR; INTRAMUSCULAR ONCE
Status: COMPLETED | OUTPATIENT
Start: 2021-06-17 | End: 2021-06-17

## 2021-06-17 RX ADMIN — HYDROXYZINE HYDROCHLORIDE 25 MG: 50 INJECTION, SOLUTION INTRAMUSCULAR at 02:18

## 2021-06-17 RX ADMIN — TRIAMCINOLONE ACETONIDE 60 MG: 40 INJECTION, SUSPENSION INTRA-ARTICULAR; INTRAMUSCULAR at 02:18

## 2021-06-17 NOTE — ED PROVIDER NOTES
provider has spoken with the patient and discussed todays results, in addition to providing specific details for the plan of care and counseling regarding the diagnosis and prognosis. Questions are answered at this time and they are agreeable with the plan.      --------------------------------- IMPRESSION AND DISPOSITION ---------------------------------    IMPRESSION  1. Urticaria        DISPOSITION  Disposition: Discharge to home  Patient condition is good      NOTE: This report was transcribed using voice recognition software.  Every effort was made to ensure accuracy; however, inadvertent computerized transcription errors may be present       Georgie Vasquez DO  06/17/21 6112

## 2021-10-15 ENCOUNTER — HOSPITAL ENCOUNTER (EMERGENCY)
Age: 46
Discharge: HOME | End: 2021-10-15
Attending: EMERGENCY MEDICINE

## 2021-10-15 VITALS
RESPIRATION RATE: 16 BRPM | DIASTOLIC BLOOD PRESSURE: 62 MMHG | WEIGHT: 129 LBS | HEIGHT: 63 IN | HEART RATE: 101 BPM | BODY MASS INDEX: 22.86 KG/M2 | TEMPERATURE: 96.4 F | SYSTOLIC BLOOD PRESSURE: 111 MMHG | OXYGEN SATURATION: 97 %

## 2021-10-15 DIAGNOSIS — L03.211 CELLULITIS OF FACE: Primary | ICD-10-CM

## 2021-10-15 PROCEDURE — 6370000000 HC RX 637 (ALT 250 FOR IP): Performed by: EMERGENCY MEDICINE

## 2021-10-15 PROCEDURE — 99283 EMERGENCY DEPT VISIT LOW MDM: CPT

## 2021-10-15 RX ORDER — AMOXICILLIN AND CLAVULANATE POTASSIUM 875; 125 MG/1; MG/1
1 TABLET, FILM COATED ORAL 2 TIMES DAILY
Qty: 20 TABLET | Refills: 0 | Status: SHIPPED | OUTPATIENT
Start: 2021-10-15 | End: 2021-10-25

## 2021-10-15 RX ORDER — AMOXICILLIN AND CLAVULANATE POTASSIUM 875; 125 MG/1; MG/1
1 TABLET, FILM COATED ORAL ONCE
Status: COMPLETED | OUTPATIENT
Start: 2021-10-15 | End: 2021-10-15

## 2021-10-15 RX ADMIN — AMOXICILLIN AND CLAVULANATE POTASSIUM 1 TABLET: 875; 125 TABLET, FILM COATED ORAL at 02:56

## 2021-10-15 ASSESSMENT — PAIN SCALES - GENERAL: PAINLEVEL_OUTOF10: 2

## 2021-10-15 ASSESSMENT — ENCOUNTER SYMPTOMS
FACIAL SWELLING: 1
SORE THROAT: 0
EYE REDNESS: 0
SINUS PRESSURE: 0
SINUS PAIN: 1
EYE PAIN: 0
COLOR CHANGE: 1
TROUBLE SWALLOWING: 0

## 2021-10-15 ASSESSMENT — PAIN DESCRIPTION - PAIN TYPE: TYPE: ACUTE PAIN

## 2021-10-15 ASSESSMENT — PAIN DESCRIPTION - ORIENTATION: ORIENTATION: LEFT

## 2021-10-15 ASSESSMENT — PAIN DESCRIPTION - DESCRIPTORS: DESCRIPTORS: ACHING

## 2021-10-15 ASSESSMENT — PAIN DESCRIPTION - LOCATION: LOCATION: FACE

## 2021-10-15 NOTE — ED PROVIDER NOTES
Patient presented to the ED for evaluation of left-sided facial swelling. She states she noticed this yesterday around 3 in the morning. Since then has been gradually worsening. She states she has chronic sinus issues and they drained all year round. She has not noticed any increase in the drainage of her sinuses. No reported fever or chills. No associated nausea or vomiting. She denies any dental pain but states she did notice a little \"bubble\" on her upper gums. She has implants. Denies any pain with swallowing. States pain is mild to moderate in severity and has been persistent. Review of Systems   Constitutional: Negative for chills, diaphoresis, fatigue and fever. HENT: Positive for facial swelling and sinus pain. Negative for congestion, ear pain, sinus pressure, sore throat and trouble swallowing. Eyes: Negative for pain and redness. Skin: Positive for color change. Neurological: Negative for dizziness and headaches. Hematological: Negative for adenopathy. Physical Exam  Vitals and nursing note reviewed. Constitutional:       General: She is not in acute distress. Appearance: She is well-developed and normal weight. She is not diaphoretic. HENT:      Head: Normocephalic and atraumatic. Comments: Mild facial edema noted over the left cheek. No fluctuance appreciated. Mild erythema overlying this region consistent with a facial cellulitis. No tenderness to percussion of the maxillary sinuses. Specifically not on the left. Nose: No congestion or rhinorrhea. Mouth/Throat:      Mouth: Mucous membranes are moist.      Pharynx: No oropharyngeal exudate or posterior oropharyngeal erythema. Comments: No signs of Ajmes's angina. No signs of dental abscess. No gingival erythema. No signs of ANUG. No trismus. Eyes:      Conjunctiva/sclera: Conjunctivae normal.   Cardiovascular:      Rate and Rhythm: Normal rate and regular rhythm.       Heart sounds: Normal heart sounds. No murmur heard. Pulmonary:      Effort: Pulmonary effort is normal. No respiratory distress. Breath sounds: Normal breath sounds. No wheezing or rales. Abdominal:      General: Bowel sounds are normal.      Palpations: Abdomen is soft. Tenderness: There is no abdominal tenderness. There is no guarding or rebound. Musculoskeletal:      Cervical back: Normal range of motion and neck supple. Lymphadenopathy:      Cervical: No cervical adenopathy ( No appreciable anterior cervical lymphadenopathy. ). Skin:     General: Skin is warm and dry. Neurological:      Mental Status: She is alert and oriented to person, place, and time. Procedures     MDM   Patient presents to the ED with a complaint of left-sided facial swelling. Findings consistent with possible facial cellulitis. No tenderness to percussion of the maxillary sinuses. No purulent discharge noted on ENT exam.  She will be placed on Augmentin for the cellulitis and advised to follow-up with her PCP. She understands if she has worsening symptoms or new concerns that she can return to the ED for further evaluation.    --------------------------------------------- PAST HISTORY ---------------------------------------------  Past Medical History:  has a past medical history of Eczema. Past Surgical History:  has a past surgical history that includes Bunionectomy and Breast lumpectomy. Social History:  reports that she has been smoking cigarettes. She has been smoking about 0.50 packs per day. She has never used smokeless tobacco. She reports that she does not drink alcohol and does not use drugs. Family History: family history is not on file. The patients home medications have been reviewed.     Allergies: Potassium-containing compounds    -------------------------------------------------- RESULTS -------------------------------------------------  Labs:  No results found for this visit on 10/15/21. Radiology:  No orders to display       ------------------------- NURSING NOTES AND VITALS REVIEWED ---------------------------  Date / Time Roomed:  10/15/2021  2:21 AM  ED Bed Assignment:  [id number]    The nursing notes within the ED encounter and vital signs as below have been reviewed. /62   Pulse 101   Temp 96.4 °F (35.8 °C) (Infrared)   Resp 16   Ht 5' 3\" (1.6 m)   Wt 129 lb (58.5 kg)   LMP 10/08/2021   SpO2 97%   BMI 22.85 kg/m²   Oxygen Saturation Interpretation: Normal      ------------------------------------------ PROGRESS NOTES ------------------------------------------  I have spoken with the patient and discussed todays results, in addition to providing specific details for the plan of care and counseling regarding the diagnosis and prognosis. Their questions are answered at this time and they are agreeable with the plan. I discussed at length with them reasons for immediate return here for re evaluation. They will followup with primary care by calling their office tomorrow. --------------------------------- ADDITIONAL PROVIDER NOTES ---------------------------------  At this time the patient is without objective evidence of an acute process requiring hospitalization or inpatient management. They have remained hemodynamically stable throughout their entire ED visit and are stable for discharge with outpatient follow-up. The plan has been discussed in detail and they are aware of the specific conditions for emergent return, as well as the importance of follow-up. New Prescriptions    AMOXICILLIN-CLAVULANATE (AUGMENTIN) 875-125 MG PER TABLET    Take 1 tablet by mouth 2 times daily for 10 days       Diagnosis:  1. Cellulitis of face        Disposition:  Patient's disposition: Discharge to home  Patient's condition is stable.           Desmond Fields DO  10/15/21 DO Vivek  10/15/21 8142

## 2022-02-07 NOTE — PATIENT INSTRUCTIONS
LVM to confirm appointment for tomorrow with Dr. Hernandez   Patient Education        Sinusitis: Care Instructions  Your Care Instructions    Sinusitis is an infection of the lining of the sinus cavities in your head. Sinusitis often follows a cold. It causes pain and pressure in your head and face. In most cases, sinusitis gets better on its own in 1 to 2 weeks. But some mild symptoms may last for several weeks. Sometimes antibiotics are needed. Follow-up care is a key part of your treatment and safety. Be sure to make and go to all appointments, and call your doctor if you are having problems. It's also a good idea to know your test results and keep a list of the medicines you take. How can you care for yourself at home? · Take an over-the-counter pain medicine, such as acetaminophen (Tylenol), ibuprofen (Advil, Motrin), or naproxen (Aleve). Read and follow all instructions on the label. · If the doctor prescribed antibiotics, take them as directed. Do not stop taking them just because you feel better. You need to take the full course of antibiotics. · Be careful when taking over-the-counter cold or flu medicines and Tylenol at the same time. Many of these medicines have acetaminophen, which is Tylenol. Read the labels to make sure that you are not taking more than the recommended dose. Too much acetaminophen (Tylenol) can be harmful. · Breathe warm, moist air from a steamy shower, a hot bath, or a sink filled with hot water. Avoid cold, dry air. Using a humidifier in your home may help. Follow the directions for cleaning the machine. · Use saline (saltwater) nasal washes to help keep your nasal passages open and wash out mucus and bacteria. You can buy saline nose drops at a grocery store or drugstore. Or you can make your own at home by adding 1 teaspoon of salt and 1 teaspoon of baking soda to 2 cups of distilled water. If you make your own, fill a bulb syringe with the solution, insert the tip into your nostril, and squeeze gently. [place] your nose.   · Put a hot, wet towel or a warm gel pack on your face 3 or 4 times a day for 5 to 10 minutes each time. · Try a decongestant nasal spray like oxymetazoline (Afrin). Do not use it for more than 3 days in a row. Using it for more than 3 days can make your congestion worse. When should you call for help? Call your doctor now or seek immediate medical care if:    · You have new or worse swelling or redness in your face or around your eyes.     · You have a new or higher fever.    Watch closely for changes in your health, and be sure to contact your doctor if:    · You have new or worse facial pain.     · The mucus from your nose becomes thicker (like pus) or has new blood in it.     · You are not getting better as expected. Where can you learn more? Go to https://e[employer]emervin[employer]achtree Village [employer]eb.[employer]. org and sign in to your [employer]Long.com account. Enter X222 in the [employer] box to learn more about \"Sinusitis: Care Instructions. \"     If you do not have an account, please click on the \"Sign Up Now\" link. Current as of: October 21, 2018  Content Version: 12.0  © 7288-7528 [employer]wise, Inc[employer]. Care instructions adapted under license by [facility] ([facility]). If you have questions about a medical condition or this instruction, always ask your healthcare professional. [address] any warranty or liability for your use of this information.

## 2022-07-28 ENCOUNTER — HOSPITAL ENCOUNTER (INPATIENT)
Age: 47
LOS: 1 days | Discharge: HOME | DRG: 758 | End: 2022-07-29
Attending: EMERGENCY MEDICINE | Admitting: INTERNAL MEDICINE
Payer: COMMERCIAL

## 2022-07-28 ENCOUNTER — APPOINTMENT (OUTPATIENT)
Dept: CT IMAGING | Age: 47
DRG: 758 | End: 2022-07-28
Payer: COMMERCIAL

## 2022-07-28 ENCOUNTER — APPOINTMENT (OUTPATIENT)
Dept: GENERAL RADIOLOGY | Age: 47
DRG: 758 | End: 2022-07-28
Payer: COMMERCIAL

## 2022-07-28 DIAGNOSIS — R10.33 PERIUMBILICAL ABDOMINAL PAIN: ICD-10-CM

## 2022-07-28 DIAGNOSIS — R31.9 URINARY TRACT INFECTION WITH HEMATURIA, SITE UNSPECIFIED: ICD-10-CM

## 2022-07-28 DIAGNOSIS — K65.1 RIGHT LOWER QUADRANT ABDOMINAL ABSCESS (HCC): Primary | ICD-10-CM

## 2022-07-28 DIAGNOSIS — N39.0 URINARY TRACT INFECTION WITH HEMATURIA, SITE UNSPECIFIED: ICD-10-CM

## 2022-07-28 LAB
ALBUMIN SERPL-MCNC: 3.9 G/DL (ref 3.5–5.2)
ALP BLD-CCNC: 158 U/L (ref 35–104)
ALT SERPL-CCNC: 19 U/L (ref 0–32)
ANION GAP SERPL CALCULATED.3IONS-SCNC: 15 MMOL/L (ref 7–16)
AST SERPL-CCNC: 24 U/L (ref 0–31)
BACTERIA: ABNORMAL /HPF
BASOPHILS ABSOLUTE: 0.01 E9/L (ref 0–0.2)
BASOPHILS RELATIVE PERCENT: 0.1 % (ref 0–2)
BILIRUB SERPL-MCNC: 0.8 MG/DL (ref 0–1.2)
BILIRUBIN URINE: ABNORMAL
BLOOD, URINE: ABNORMAL
BUN BLDV-MCNC: 22 MG/DL (ref 6–20)
CALCIUM SERPL-MCNC: 9.7 MG/DL (ref 8.6–10.2)
CHLORIDE BLD-SCNC: 102 MMOL/L (ref 98–107)
CLARITY: ABNORMAL
CO2: 20 MMOL/L (ref 22–29)
COLOR: YELLOW
CREAT SERPL-MCNC: 1 MG/DL (ref 0.5–1)
EOSINOPHILS ABSOLUTE: 0.06 E9/L (ref 0.05–0.5)
EOSINOPHILS RELATIVE PERCENT: 0.7 % (ref 0–6)
GFR AFRICAN AMERICAN: >60
GFR NON-AFRICAN AMERICAN: 59 ML/MIN/1.73
GLUCOSE BLD-MCNC: 160 MG/DL (ref 74–99)
GLUCOSE URINE: NEGATIVE MG/DL
HCG, URINE, POC: NEGATIVE
HCT VFR BLD CALC: 35.9 % (ref 34–48)
HEMOGLOBIN: 12.2 G/DL (ref 11.5–15.5)
HYALINE CASTS: ABNORMAL /LPF (ref 0–2)
IMMATURE GRANULOCYTES #: 0.02 E9/L
IMMATURE GRANULOCYTES %: 0.2 % (ref 0–5)
KETONES, URINE: NEGATIVE MG/DL
LACTIC ACID: 1.2 MMOL/L (ref 0.5–2.2)
LEUKOCYTE ESTERASE, URINE: ABNORMAL
LIPASE: 16 U/L (ref 13–60)
LYMPHOCYTES ABSOLUTE: 1.7 E9/L (ref 1.5–4)
LYMPHOCYTES RELATIVE PERCENT: 20.2 % (ref 20–42)
Lab: NORMAL
MCH RBC QN AUTO: 27.2 PG (ref 26–35)
MCHC RBC AUTO-ENTMCNC: 34 % (ref 32–34.5)
MCV RBC AUTO: 80.1 FL (ref 80–99.9)
MONOCYTES ABSOLUTE: 0.65 E9/L (ref 0.1–0.95)
MONOCYTES RELATIVE PERCENT: 7.7 % (ref 2–12)
MUCUS: PRESENT /LPF
NEGATIVE QC PASS/FAIL: NORMAL
NEUTROPHILS ABSOLUTE: 5.99 E9/L (ref 1.8–7.3)
NEUTROPHILS RELATIVE PERCENT: 71.1 % (ref 43–80)
NITRITE, URINE: NEGATIVE
PDW BLD-RTO: 12.7 FL (ref 11.5–15)
PH UA: 5.5 (ref 5–9)
PLATELET # BLD: 245 E9/L (ref 130–450)
PMV BLD AUTO: 11.2 FL (ref 7–12)
POSITIVE QC PASS/FAIL: NORMAL
POTASSIUM SERPL-SCNC: 3.5 MMOL/L (ref 3.5–5)
PROTEIN UA: 30 MG/DL
RBC # BLD: 4.48 E12/L (ref 3.5–5.5)
RBC UA: ABNORMAL /HPF (ref 0–2)
SODIUM BLD-SCNC: 137 MMOL/L (ref 132–146)
SPECIFIC GRAVITY UA: >=1.03 (ref 1–1.03)
TOTAL PROTEIN: 7.3 G/DL (ref 6.4–8.3)
TROPONIN, HIGH SENSITIVITY: <6 NG/L (ref 0–9)
UROBILINOGEN, URINE: 1 E.U./DL
WBC # BLD: 8.4 E9/L (ref 4.5–11.5)
WBC UA: ABNORMAL /HPF (ref 0–5)

## 2022-07-28 PROCEDURE — 83690 ASSAY OF LIPASE: CPT

## 2022-07-28 PROCEDURE — 80053 COMPREHEN METABOLIC PANEL: CPT

## 2022-07-28 PROCEDURE — 99285 EMERGENCY DEPT VISIT HI MDM: CPT

## 2022-07-28 PROCEDURE — 84484 ASSAY OF TROPONIN QUANT: CPT

## 2022-07-28 PROCEDURE — 71045 X-RAY EXAM CHEST 1 VIEW: CPT

## 2022-07-28 PROCEDURE — 81001 URINALYSIS AUTO W/SCOPE: CPT

## 2022-07-28 PROCEDURE — 87186 SC STD MICRODIL/AGAR DIL: CPT

## 2022-07-28 PROCEDURE — 83605 ASSAY OF LACTIC ACID: CPT

## 2022-07-28 PROCEDURE — 87077 CULTURE AEROBIC IDENTIFY: CPT

## 2022-07-28 PROCEDURE — 74176 CT ABD & PELVIS W/O CONTRAST: CPT

## 2022-07-28 PROCEDURE — 93005 ELECTROCARDIOGRAM TRACING: CPT | Performed by: NURSE PRACTITIONER

## 2022-07-28 PROCEDURE — 85025 COMPLETE CBC W/AUTO DIFF WBC: CPT

## 2022-07-28 PROCEDURE — 87088 URINE BACTERIA CULTURE: CPT

## 2022-07-28 RX ORDER — 0.9 % SODIUM CHLORIDE 0.9 %
1000 INTRAVENOUS SOLUTION INTRAVENOUS ONCE
Status: DISCONTINUED | OUTPATIENT
Start: 2022-07-28 | End: 2022-07-28

## 2022-07-28 RX ORDER — MORPHINE SULFATE 2 MG/ML
2 INJECTION, SOLUTION INTRAMUSCULAR; INTRAVENOUS ONCE
Status: DISCONTINUED | OUTPATIENT
Start: 2022-07-28 | End: 2022-07-28

## 2022-07-28 RX ORDER — ONDANSETRON 2 MG/ML
4 INJECTION INTRAMUSCULAR; INTRAVENOUS ONCE
Status: DISCONTINUED | OUTPATIENT
Start: 2022-07-28 | End: 2022-07-28

## 2022-07-28 ASSESSMENT — PAIN DESCRIPTION - LOCATION: LOCATION: ABDOMEN

## 2022-07-28 ASSESSMENT — PAIN DESCRIPTION - PAIN TYPE: TYPE: ACUTE PAIN

## 2022-07-28 ASSESSMENT — PAIN SCALES - GENERAL: PAINLEVEL_OUTOF10: 3

## 2022-07-28 ASSESSMENT — PAIN DESCRIPTION - DESCRIPTORS: DESCRIPTORS: ACHING

## 2022-07-28 ASSESSMENT — PAIN - FUNCTIONAL ASSESSMENT: PAIN_FUNCTIONAL_ASSESSMENT: 0-10

## 2022-07-28 ASSESSMENT — PAIN DESCRIPTION - ORIENTATION: ORIENTATION: MID

## 2022-07-29 ENCOUNTER — APPOINTMENT (OUTPATIENT)
Dept: ULTRASOUND IMAGING | Age: 47
DRG: 758 | End: 2022-07-29
Payer: COMMERCIAL

## 2022-07-29 ENCOUNTER — APPOINTMENT (OUTPATIENT)
Dept: CT IMAGING | Age: 47
DRG: 758 | End: 2022-07-29
Payer: COMMERCIAL

## 2022-07-29 VITALS
DIASTOLIC BLOOD PRESSURE: 79 MMHG | BODY MASS INDEX: 19.67 KG/M2 | HEART RATE: 96 BPM | RESPIRATION RATE: 16 BRPM | SYSTOLIC BLOOD PRESSURE: 122 MMHG | HEIGHT: 63 IN | TEMPERATURE: 97.3 F | WEIGHT: 111 LBS | OXYGEN SATURATION: 100 %

## 2022-07-29 LAB
BACTERIA WET PREP: NORMAL
CLUE CELLS: NORMAL
EKG ATRIAL RATE: 117 BPM
EKG P AXIS: 58 DEGREES
EKG P-R INTERVAL: 130 MS
EKG Q-T INTERVAL: 310 MS
EKG QRS DURATION: 76 MS
EKG QTC CALCULATION (BAZETT): 432 MS
EKG R AXIS: 84 DEGREES
EKG T AXIS: 42 DEGREES
EKG VENTRICULAR RATE: 117 BPM
EPITHELIAL CELLS WET PREP: NORMAL
RBC WET PREP: NORMAL
SOURCE WET PREP: NORMAL
TRICHOMONAS PREP: NORMAL
WBC WET PREP: NORMAL
YEAST WET PREP: NORMAL

## 2022-07-29 PROCEDURE — G0378 HOSPITAL OBSERVATION PER HR: HCPCS

## 2022-07-29 PROCEDURE — 6360000004 HC RX CONTRAST MEDICATION: Performed by: RADIOLOGY

## 2022-07-29 PROCEDURE — 36415 COLL VENOUS BLD VENIPUNCTURE: CPT

## 2022-07-29 PROCEDURE — 1200000000 HC SEMI PRIVATE

## 2022-07-29 PROCEDURE — 87210 SMEAR WET MOUNT SALINE/INK: CPT

## 2022-07-29 PROCEDURE — 96368 THER/DIAG CONCURRENT INF: CPT

## 2022-07-29 PROCEDURE — 2500000003 HC RX 250 WO HCPCS: Performed by: PHYSICIAN ASSISTANT

## 2022-07-29 PROCEDURE — 2580000003 HC RX 258: Performed by: NURSE PRACTITIONER

## 2022-07-29 PROCEDURE — 2500000003 HC RX 250 WO HCPCS: Performed by: NURSE PRACTITIONER

## 2022-07-29 PROCEDURE — 96366 THER/PROPH/DIAG IV INF ADDON: CPT

## 2022-07-29 PROCEDURE — 96375 TX/PRO/DX INJ NEW DRUG ADDON: CPT

## 2022-07-29 PROCEDURE — 87591 N.GONORRHOEAE DNA AMP PROB: CPT

## 2022-07-29 PROCEDURE — 87040 BLOOD CULTURE FOR BACTERIA: CPT

## 2022-07-29 PROCEDURE — 2580000003 HC RX 258: Performed by: PHYSICIAN ASSISTANT

## 2022-07-29 PROCEDURE — 76856 US EXAM PELVIC COMPLETE: CPT

## 2022-07-29 PROCEDURE — 96365 THER/PROPH/DIAG IV INF INIT: CPT

## 2022-07-29 PROCEDURE — 74177 CT ABD & PELVIS W/CONTRAST: CPT

## 2022-07-29 PROCEDURE — 87491 CHLMYD TRACH DNA AMP PROBE: CPT

## 2022-07-29 PROCEDURE — 6360000002 HC RX W HCPCS: Performed by: NURSE PRACTITIONER

## 2022-07-29 PROCEDURE — 96374 THER/PROPH/DIAG INJ IV PUSH: CPT

## 2022-07-29 RX ORDER — SODIUM CHLORIDE 9 MG/ML
25 INJECTION, SOLUTION INTRAVENOUS PRN
Status: DISCONTINUED | OUTPATIENT
Start: 2022-07-29 | End: 2022-07-29 | Stop reason: HOSPADM

## 2022-07-29 RX ORDER — METRONIDAZOLE 500 MG/100ML
500 INJECTION, SOLUTION INTRAVENOUS ONCE
Status: COMPLETED | OUTPATIENT
Start: 2022-07-29 | End: 2022-07-29

## 2022-07-29 RX ORDER — PROMETHAZINE HYDROCHLORIDE 12.5 MG/1
12.5 TABLET ORAL EVERY 6 HOURS PRN
Status: DISCONTINUED | OUTPATIENT
Start: 2022-07-29 | End: 2022-07-29 | Stop reason: HOSPADM

## 2022-07-29 RX ORDER — SODIUM CHLORIDE 9 MG/ML
INJECTION, SOLUTION INTRAVENOUS CONTINUOUS
Status: DISCONTINUED | OUTPATIENT
Start: 2022-07-29 | End: 2022-07-29

## 2022-07-29 RX ORDER — SODIUM CHLORIDE 0.9 % (FLUSH) 0.9 %
5-40 SYRINGE (ML) INJECTION EVERY 12 HOURS SCHEDULED
Status: DISCONTINUED | OUTPATIENT
Start: 2022-07-29 | End: 2022-07-29 | Stop reason: HOSPADM

## 2022-07-29 RX ORDER — ONDANSETRON 2 MG/ML
4 INJECTION INTRAMUSCULAR; INTRAVENOUS EVERY 6 HOURS PRN
Status: DISCONTINUED | OUTPATIENT
Start: 2022-07-29 | End: 2022-07-29 | Stop reason: HOSPADM

## 2022-07-29 RX ORDER — CIPROFLOXACIN 500 MG/1
500 TABLET, FILM COATED ORAL 2 TIMES DAILY
Qty: 10 TABLET | Refills: 0 | Status: SHIPPED | OUTPATIENT
Start: 2022-07-29 | End: 2022-08-03

## 2022-07-29 RX ORDER — METRONIDAZOLE 500 MG/1
500 TABLET ORAL 3 TIMES DAILY
Qty: 15 TABLET | Refills: 0 | Status: SHIPPED | OUTPATIENT
Start: 2022-07-29 | End: 2022-08-03

## 2022-07-29 RX ORDER — ACETAMINOPHEN 325 MG/1
650 TABLET ORAL EVERY 6 HOURS PRN
Status: DISCONTINUED | OUTPATIENT
Start: 2022-07-29 | End: 2022-07-29 | Stop reason: HOSPADM

## 2022-07-29 RX ORDER — METRONIDAZOLE 500 MG/100ML
500 INJECTION, SOLUTION INTRAVENOUS EVERY 8 HOURS
Status: DISCONTINUED | OUTPATIENT
Start: 2022-07-29 | End: 2022-07-29 | Stop reason: HOSPADM

## 2022-07-29 RX ORDER — POLYETHYLENE GLYCOL 3350 17 G/17G
17 POWDER, FOR SOLUTION ORAL DAILY PRN
Status: DISCONTINUED | OUTPATIENT
Start: 2022-07-29 | End: 2022-07-29 | Stop reason: HOSPADM

## 2022-07-29 RX ORDER — ACETAMINOPHEN 650 MG/1
650 SUPPOSITORY RECTAL EVERY 6 HOURS PRN
Status: DISCONTINUED | OUTPATIENT
Start: 2022-07-29 | End: 2022-07-29 | Stop reason: HOSPADM

## 2022-07-29 RX ORDER — SODIUM CHLORIDE 0.9 % (FLUSH) 0.9 %
5-40 SYRINGE (ML) INJECTION PRN
Status: DISCONTINUED | OUTPATIENT
Start: 2022-07-29 | End: 2022-07-29 | Stop reason: HOSPADM

## 2022-07-29 RX ORDER — 0.9 % SODIUM CHLORIDE 0.9 %
1000 INTRAVENOUS SOLUTION INTRAVENOUS ONCE
Status: DISCONTINUED | OUTPATIENT
Start: 2022-07-29 | End: 2022-07-29 | Stop reason: HOSPADM

## 2022-07-29 RX ADMIN — WATER 1000 MG: 1 INJECTION INTRAMUSCULAR; INTRAVENOUS; SUBCUTANEOUS at 04:24

## 2022-07-29 RX ADMIN — METRONIDAZOLE 500 MG: 500 INJECTION, SOLUTION INTRAVENOUS at 12:33

## 2022-07-29 RX ADMIN — DOXYCYCLINE 100 MG: 100 INJECTION, POWDER, LYOPHILIZED, FOR SOLUTION INTRAVENOUS at 04:27

## 2022-07-29 RX ADMIN — SODIUM CHLORIDE, PRESERVATIVE FREE 10 ML: 5 INJECTION INTRAVENOUS at 07:03

## 2022-07-29 RX ADMIN — SODIUM CHLORIDE: 9 INJECTION, SOLUTION INTRAVENOUS at 07:11

## 2022-07-29 RX ADMIN — IOPAMIDOL 50 ML: 755 INJECTION, SOLUTION INTRAVENOUS at 01:51

## 2022-07-29 RX ADMIN — METRONIDAZOLE 500 MG: 500 INJECTION, SOLUTION INTRAVENOUS at 04:27

## 2022-07-29 ASSESSMENT — ENCOUNTER SYMPTOMS
DIARRHEA: 0
TROUBLE SWALLOWING: 0
COLOR CHANGE: 0
COUGH: 0
FACIAL SWELLING: 0
NAUSEA: 0
VOMITING: 0
CONSTIPATION: 0
ABDOMINAL PAIN: 1
SHORTNESS OF BREATH: 0

## 2022-07-29 ASSESSMENT — PAIN DESCRIPTION - LOCATION: LOCATION: ABDOMEN

## 2022-07-29 ASSESSMENT — PAIN SCALES - GENERAL
PAINLEVEL_OUTOF10: 0
PAINLEVEL_OUTOF10: 1
PAINLEVEL_OUTOF10: 1

## 2022-07-29 ASSESSMENT — PAIN DESCRIPTION - ORIENTATION: ORIENTATION: MID

## 2022-07-29 NOTE — H&P
[facility]  History and Physical      CHIEF COMPLAINT:    Chief Complaint   Patient presents with    Abdominal Pain     Patient states mid abdominal pain x 10 days. +n/-v/+d. Denies fever/chills. Denies dysuria. Patient of Billy Felipe DO presents with:  Abscess    History of Present Illness:   Patient is a 54-year-old female with a past medical history of eczema. Patient presented to the ED with complaints of mid abdominal pain with nausea vomiting diarrhea over the past 10 days. Patient states has been intermittent but over the last day it has been more constant and intense. Patient does have nausea as well as intermittent diarrhea. Approximately 2 months ago patient was treated for gonorrhea and chlamydia. Patient's partner was not treated. Patient also reports she has been having vaginal discharge that is brown in color she is seeing her OB/GYN who had attributed to her being perimenopausal.  Patient is alert and oriented on examination. Patient denies any chest pain, shortness of breath, fever, chills, headache, dizziness, blurred vision. ER work-up revealed abdominal CAT scan abdomen pelvis for possible abscess, blood cultures pending, general surgery consulted and patient is admitted to [place] [address] unit. REVIEW OF SYSTEMS:  Pertinent negatives are above in HPI. 10 point ROS otherwise negative.       Past Medical History:   Diagnosis Date    Eczema          Past Surgical History:   Procedure Laterality Date    BREAST LUMPECTOMY      BUNIONECTOMY         Medications Prior to Admission:    Medications Prior to Admission: EPINEPHrine (EPIPEN 2-PARKER) 0.3 MG/0.3ML SOAJ injection, Inject 0.3 mLs into the muscle once as needed (anaphylaxis) Use as directed for allergic reaction    Note that the patient's home medications were reviewed and the above list is accurate to the best of my knowledge at the time of the exam.    Allergies:    Benadryl [diphenhydramine], Potassium-containing compounds, and Reglan [metoclopramide]    Social History:    reports that she has been smoking cigarettes. She has been smoking an average of .5 packs per day. She has never used smokeless tobacco. She reports that she does not drink alcohol and does not use drugs. Family History: Mother: Rheumatoid arthritis      PHYSICAL EXAM:    Vitals:  /70   Pulse (!) 101   Temp 98.2 °F (36.8 °C)   Resp 16   Ht 5' 3\" (1.6 m)   Wt 111 lb (50.3 kg)   SpO2 98%   BMI 19.66 kg/m²       General appearance: NAD, conversant, pleasant  Eyes: Sclerae anicteric, PERRLA  HEENT: AT/NC, MMM  Neck: FROM, supple, no thyromegaly  Lymph: No cervical / supraclavicular lymphadenopathy  Lungs: Clear to auscultation, WOB normal  CV: RRR, no MRGs, no lower extremity edema  Abdomen: Soft, tender; no masses or HSM, +BS  Extremities: FROM without synovitis. No clubbing or cyanosis of the hands. Skin: no rash, induration, lesions, or ulcers  Psych: Calm and cooperative. Normal judgement and insight. Normal mood and affect. Neuro: Alert and interactive, face symmetric, speech fluent. 5/5 strength in all extremities    LABS:  All labs reviewed.   Of note:  CBC with Differential:    Lab Results   Component Value Date/Time    WBC 8.4 07/28/2022 10:04 PM    RBC 4.48 07/28/2022 10:04 PM    HGB 12.2 07/28/2022 10:04 PM    HCT 35.9 07/28/2022 10:04 PM     07/28/2022 10:04 PM    MCV 80.1 07/28/2022 10:04 PM    MCH 27.2 07/28/2022 10:04 PM    MCHC 34.0 07/28/2022 10:04 PM    RDW 12.7 07/28/2022 10:04 PM    SEGSPCT 73 11/08/2010 05:45 AM    LYMPHOPCT 20.2 07/28/2022 10:04 PM    MONOPCT 7.7 07/28/2022 10:04 PM    BASOPCT 0.1 07/28/2022 10:04 PM    MONOSABS 0.65 07/28/2022 10:04 PM    LYMPHSABS 1.70 07/28/2022 10:04 PM    EOSABS 0.06 07/28/2022 10:04 PM    BASOSABS 0.01 07/28/2022 10:04 PM     CMP:    Lab Results   Component Value Date/Time     07/28/2022 10:04 PM    K 3.5 07/28/2022 10:04 PM     07/28/2022 10:04 PM    CO2 20 07/28/2022 10:04 PM    BUN 22 07/28/2022 10:04 PM    CREATININE 1.0 07/28/2022 10:04 PM    GFRAA >60 07/28/2022 10:04 PM    LABGLOM 59 07/28/2022 10:04 PM    GLUCOSE 160 07/28/2022 10:04 PM    GLUCOSE 92 11/08/2010 05:45 AM    PROT 7.3 07/28/2022 10:04 PM    LABALBU 3.9 07/28/2022 10:04 PM    LABALBU 4.8 11/07/2010 02:25 PM    CALCIUM 9.7 07/28/2022 10:04 PM    BILITOT 0.8 07/28/2022 10:04 PM    ALKPHOS 158 07/28/2022 10:04 PM    AST 24 07/28/2022 10:04 PM    ALT 19 07/28/2022 10:04 PM       Imaging:  CT abdomen pelvis: Acute inflammatory process suspected in the right lower quadrant. The appendix is not identified. This does not exclude perforated appendicitis. There is a 3 cm thick-walled cystic structure in the right pelvis. Bladder wall thickening. Incidental findings atrophic left kidney. Upper limits of normal splenic size. Telemetry:  I've personally reviewed the patient's telemetry:      ASSESSMENT/PLAN:  Principal Problem:    Abscess  Active Problems:    UTI (urinary tract infection)  Resolved Problems:    * No resolved hospital problems. *    48-year-old female presents to the ED with complaints of abdominal pain and is admitted to [place] [address] unit with    Pelvic abscess/tubuovarian? Unclear if this is actually adequate-ultrasound reading appears to be equivocal  Monitor labs-WBC 8.4-no evidence of fevers chills nausea vomiting or symptoms of sepsis  Pain control  Consult general surgery-appreciate input  ATB's - Vibramycin, Flagyl  Await cultures  Pelvic ultrasound as above    UTI  Await cultures  Rocephin 1 g every 24  Smoking cessation    Medication for other comorbidities continue as appropriate dose adjustment as necessary. DVT prophylaxis  PT OT  Discharge planning-plan to transition to p.o. antibiotics tomorrow and discharge home if she does okay through tonight  Case discussed with attending and agreed upon plan of care.        Code status: Full  Requires

## 2022-07-29 NOTE — PROCEDURES
Patient was confused as to why she was getting a chest x-ray for pain when she is not having any chest pain. She stated that it was abdominal pain. I told her she could go back to the er to speak to the ordering provider but was still ok with receiving the chest x-ray. Chest x-ray completed at 2233.

## 2022-07-29 NOTE — ED NOTES
Department of Emergency Medicine  FIRST PROVIDER TRIAGE NOTE             Independent MLP           7/28/22  10:27 PM EDT    Date of Encounter: 7/28/22   MRN: [id number]      HPI: Adele Holland is a 49 y.o. female who presents to the ED for Abdominal Pain (Patient states mid abdominal pain x 10 days. +n/-v/+d. Denies fever/chills. Denies dysuria. )  Presents with a 36-DLV history of umbilical pain. Patient reports she was nauseous she did started having an episode of diarrhea but essentially no vomiting no fevers. States no abdominal surgical histories denies chest pain or shortness of breath. ROS: Negative for cp or sob. PE: Gen Appearance/Constitutional: alert  GI: tender to palpation     Initial Plan of Care: All treatment areas with department are currently occupied. Plan to order/Initiate the following while awaiting opening in ED: labs, EKG, and imaging studies.   Initiate Treatment-Testing, Proceed toTreatment Area When Bed Available for ED Attending/MLP to Continue Care    Electronically signed by NATHALY Garzon CNP   DD: 7/28/22       NATHALY Garzon CNP  07/28/22 2413    ATTENDING PROVIDER ATTESTATION:     Supervising Physician, on-site, available for consultation, non-participatory in the evaluation or care of this patient         Juan Jose Gallardo MD  07/28/22 2600

## 2022-07-29 NOTE — PROGRESS NOTES
Comprehensive Nutrition Assessment    Type and Reason for Visit:  Initial, Positive Nutrition Screen    Nutrition Recommendations/Plan:     Continue current diet. Will monitor intakes & provide nutr recs as approriate. Reports + appetite, no need for ONS at this time. Malnutrition Assessment:  Malnutrition Status:  No malnutrition (07/29/22 1302)    Context:  Acute Illness     Findings of the 6 clinical characteristics of malnutrition:  Energy Intake:  Mild decrease in energy intake (Comment) (State minimal appetite change 2/2 being outside, more fluids)  Weight Loss:  No significant weight loss (Subjective 14% x 9 mons, Reports UBW fluctuates ~10 lbs throughout year (~115-125lbs). Denies current wt loss.)     Body Fat Loss:  No significant body fat loss     Muscle Mass Loss:  No significant muscle mass loss    Fluid Accumulation:  Unable to assess     Strength:  Not Performed    Nutrition Assessment:    Pt admit w/ abd pain (+N/-V/+D) 2/2 RLQ abd abcess, UTI w/ hematuria. PMH eczema. Tolerating regular diet with good intake per pt report. Will monitor & provide nutr recs as appropriate. Nutrition Related Findings:    A/O x 4. No edema. Abd flat, soft, +bs, + cramping. Wound Type: None       Current Nutrition Intake & Therapies:    Average Meal Intake: Unable to assess (Ordered diet. Not yet received.)  Average Supplements Intake: None Ordered  ADULT DIET; Regular    Anthropometric Measures:  Height: 5' 3\" (160 cm)  Ideal Body Weight (IBW): 115 lbs (52 kg)    Admission Body Weight: 110 lb 14.3 oz (50.3 kg) (7/28 stated)  Current Body Weight: 110 lb 14.3 oz (50.3 kg) (7/28 stated), 96.4 % IBW. Weight Source: Stated  Current BMI (kg/m2): 19.6  Usual Body Weight: 128 lb 15.5 oz (58.5 kg) (Wt 10/15/21 58.5 kg. Poor EMR wt to trend.)  % Weight Change (Calculated): -14                    BMI Categories: Normal Weight (BMI 18.5-24. 9)    Estimated Daily Nutrient Needs:  Energy Requirements Based On: Kcal/kg  Weight Used for Energy Requirements: Admission  Energy (kcal/day):  kcal/d  Weight Used for Protein Requirements: Current  Protein (g/day): 60-70 gm pro/d ( 1.2-1.4 gm pro/kg CBW)  Method Used for Fluid Requirements: 1 ml/kcal  Fluid (ml/day):  ml/d    Nutrition Diagnosis:   No nutrition diagnosis at this time    Nutrition Interventions:   Food and/or Nutrient Delivery: Continue Current Diet  Nutrition Education/Counseling: No recommendation at this time  Coordination of Nutrition Care: Continue to monitor while inpatient       Goals:     Goals: PO intake 75% or greater, by next RD assessment       Nutrition Monitoring and Evaluation:   Behavioral-Environmental Outcomes: None Identified  Food/Nutrient Intake Outcomes: Diet Advancement/Tolerance (Reports + appetite. No indication for ONS at this time. Will monitor.)  Physical Signs/Symptoms Outcomes: Constipation, Diarrhea, GI Status, Nausea or Vomiting, Nutrition Focused Physical Findings, Skin, Weight    Discharge Planning:     Too soon to determine     [employer] Amberly, RD  Contact: [phone]

## 2022-07-29 NOTE — PLAN OF CARE
Problem: Pain  Goal: Verbalizes/displays adequate comfort level or baseline comfort level  7/29/2022 0716 by Anai Jin RN  Outcome: Progressing  7/29/2022 0656 by Anai Jin RN  Outcome: Progressing

## 2022-07-29 NOTE — PROGRESS NOTES
Physical Therapy order received and chart reviewed. Per conversation with patient, Pt is independent with all functional mobility. Pt with no skilled acute PT needs at this time. Pt instructed to notify any medical team member if functional status were to change. Will discontinue PT services.      Gustavo Castrejon PT, DPT  [id number]

## 2022-07-29 NOTE — PROGRESS NOTES
Patient refusing IV as well as all IV medications. She is unsure why a CT with contrast is ordered, once explained by this RN as well as Jose Thomas she continued to refuse contrast. Dry CT to be ordered.

## 2022-07-29 NOTE — PROGRESS NOTES
Date:2022  Patient Name: Hayden Ram  MRN: [id number]  : 1975  ROOM #: [id number]    Occupational Therapy Screen    OT orders received and chart reviewed. OT screen completed as pt politely declines need for skilled therapy. Pt states no concerns for skilled OT services at this time during admission or upon return to home. Pt reports ambulating independently within room and bathroom, feels she is at baseline at this time. OT will discontinue orders at this time. Please re-order OT if there is a change in physical status and OT is needed.      Thank you,    Rossy Kay, [address] [facility] Ali Annabi OTR/L #[id number]

## 2022-07-29 NOTE — PROGRESS NOTES
Seen/examined  See consult  Afebrile  Abdomen soft without guarding  Imaging reviewed- most likely hemorrhagic cyst; no clinical findings to suggest abscess (afebrile, normal WBC, no CMT) and certainly not appendicitis.   No abx needed  Pt should be discharged with outpt GYN follow up  She can see me in office for colon cancer screening  Lyn

## 2022-07-29 NOTE — ED PROVIDER NOTES
ED Physician      HPI:  7/29/22, Time: 1:29 AM EDT         Steve Montalvo is a 49 y.o. female presenting to the ED for 10-day history of abdominal pain. Patient presents to the emergency department states that she has been having mid abdominal pain more near the umbilical region over the last 10 days. States its been intermittent but over the last day more constant. She does report having nausea with this as well as minimal episodes of diarrhea that just started over the last day. Patient denies having any emesis. She denies fevers associated with this. Patient reports otherwise normal state of health. Patient also without any complaints of chest pain or shortness of breath she also denies any back or flank pain. Patient did report that she was treated 2 months ago for gonorrhea and chlamydia after testing positive. She had thought that her boyfriend had been treated but did call him while she was here in the emergency department , and he admits that he was not treated. Patient otherwise denied any concern for pregnancy. She does report that she will have vaginal discharge and reports that it is brown in color but states that her OB/GYN had attributed it to her being perimenopausal.  Patient follows with OB/GYN Dr. Jun Jensen  patient otherwise reports being in normal state of health up until this started. She denies any change in appetite. Patient with symptoms moderate in severity and persistent. Patient also denies any abdominal surgical history. Review of Systems:   A complete review of systems was performed and pertinent positives and negatives are stated within HPI, all other systems reviewed and are negative.          --------------------------------------------- PAST HISTORY ---------------------------------------------  Past Medical History:  has a past medical history of Eczema. Past Surgical History:  has a past surgical history that includes Bunionectomy and Breast lumpectomy.     Social History:  reports that she has been smoking cigarettes. She has been smoking an average of .5 packs per day. She has never used smokeless tobacco. She reports that she does not drink alcohol and does not use drugs. Family History: family history is not on file. The patients home medications have been reviewed.     Allergies: Benadryl [diphenhydramine], Potassium-containing compounds, and Reglan [metoclopramide]    -------------------------------------------------- RESULTS -------------------------------------------------  All laboratory and radiology results have been personally reviewed by myself   LABS:  Results for orders placed or performed during the hospital encounter of 07/28/22   Wet prep, genital    Specimen: Vaginal   Result Value Ref Range    Trichomonas Prep None Seen     Yeast, Wet Prep None Seen     Clue Cells, Wet Prep None Seen     WBC, Wet Prep <1+     RBC, Wet Prep <1+     Epi Cells 3+     Bacteria 3+     Source Wet Prep VAGINAL    C.trachomatis N.gonorrhoeae DNA    Specimen: Cervix   Result Value Ref Range    Source Vagina    Troponin   Result Value Ref Range    Troponin, High Sensitivity <6 0 - 9 ng/L   CBC with Auto Differential   Result Value Ref Range    WBC 8.4 4.5 - 11.5 E9/L    RBC 4.48 3.50 - 5.50 E12/L    Hemoglobin 12.2 11.5 - 15.5 g/dL    Hematocrit 35.9 34.0 - 48.0 %    MCV 80.1 80.0 - 99.9 fL    MCH 27.2 26.0 - 35.0 pg    MCHC 34.0 32.0 - 34.5 %    RDW 12.7 11.5 - 15.0 fL    Platelets 304 843 - 764 E9/L    MPV 11.2 7.0 - 12.0 fL    Neutrophils % 71.1 43.0 - 80.0 %    Immature Granulocytes % 0.2 0.0 - 5.0 %    Lymphocytes % 20.2 20.0 - 42.0 %    Monocytes % 7.7 2.0 - 12.0 %    Eosinophils % 0.7 0.0 - 6.0 %    Basophils % 0.1 0.0 - 2.0 %    Neutrophils Absolute 5.99 1.80 - 7.30 E9/L    Immature Granulocytes # 0.02 E9/L    Lymphocytes Absolute 1.70 1.50 - 4.00 E9/L    Monocytes Absolute 0.65 0.10 - 0.95 E9/L    Eosinophils Absolute 0.06 0.05 - 0.50 E9/L    Basophils Absolute 0.01 diagnosis also includes PID with possible right tubo-ovarian   abscess versus adnexal cyst.  This does not appear readily amenable to   percutaneous drainage. CT ABDOMEN PELVIS WO CONTRAST Additional Contrast? None   Final Result   ACUTE FINDINGS:      There is a 3 cm thick wall cystic structure in the right pelvis. Differential diagnosis includes adnexal cyst, tubo-ovarian abscess, or   abscess of other etiology. The appendix is obscured by this structure, and   appendicitis cannot be definitively excluded. No free air or significant   free fluid. Recommend pelvic ultrasound for further evaluation. Bladder wall thickening is nonspecific given under distension; recommend   correlation with urinalysis to evaluate for UTI. INCIDENTAL FINDINGS:      Atrophic left kidney. Upper limits of normal splenic size. Mild atherosclerotic disease. RECOMMENDATIONS:   3 cm right ovarian indeterminate cyst.      Recommend prompt follow-up with pelvic US. Reference: Radiology 2010 Sep;256(3):943-54         XR CHEST PORTABLE   Final Result   No acute process. US PELVIS COMPLETE    (Results Pending)       ------------------------- NURSING NOTES AND VITALS REVIEWED ---------------------------   The nursing notes within the ED encounter and vital signs as below have been reviewed. /70   Pulse 99   Temp 98.2 °F (36.8 °C)   Resp 16   Ht 5' 3\" (1.6 m)   Wt 111 lb (50.3 kg)   SpO2 98%   BMI 19.66 kg/m²   Oxygen Saturation Interpretation: Normal      ---------------------------------------------------PHYSICAL EXAM--------------------------------------      Constitutional/General: Alert and oriented x3, mildly uncomfortable  Head: Normocephalic and atraumatic  Eyes: PERRL, EOMI  Mouth: Oropharynx clear, handling secretions, no trismus  Neck: Supple, full ROM,   Pulmonary: Lungs clear to auscultation bilaterally, no wheezes, rales, or rhonchi.  Not in respiratory distress  Cardiovascular:  Regular rate and rhythm, no murmurs, gallops, or rubs. 2+ distal pulses  Abdomen: Soft, tender, non distended, mild point tenderness to umbilical region , no point tenderness to right lower quadrant. Pelvic exam completed by this provider. Patient did have thick brown drainage noted. No odor noted. Cervix pink otherwise patient with no cervical wall motion tenderness as well as no bilateral adnexal tenderness noted on exam.  Culture sent off. Extremities: Moves all extremities x 4. Warm and well perfused  Skin: warm and dry without rash  Neurologic: GCS 15,  Psych: Normal Affect      ------------------------------ ED COURSE/MEDICAL DECISION MAKING----------------------  Medications   0.9 % sodium chloride bolus (1,000 mLs IntraVENous Not Given 7/29/22 0114)   metronidazole (FLAGYL) 500 mg in 0.9% NaCl 100 mL IVPB premix (500 mg IntraVENous New Bag 7/29/22 0427)   doxycycline (VIBRAMYCIN) 100 mg in dextrose 5 % 100 mL IVPB ([id number]) (100 mg IntraVENous New Bag 7/29/22 0427)   iopamidol (ISOVUE-370) 76 % injection 50 mL (50 mLs IntraVENous Given 7/29/22 0151)   cefTRIAXone (ROCEPHIN) 1,000 mg in sterile water 10 mL IV syringe (1,000 mg IntraVENous Given 7/29/22 0424)         ED COURSE:       Medical Decision Making: Plan be for labs will also obtain imaging. Labs resulted troponin is negative, CBC all within normal limits, chemistry panel unremarkable, lactic acid level negative, lipase negative. Urinalysis with small leukocytes and 10-20 WBCs. Will send off urine culture. Chest x-ray unremarkable CT scan with IV contrast ordered as well as medications including IV Rocephin for the urinary tract infection. Nursing went to start IV and patient is adamantly refusing.   States she does not want any medications and is only agreeable to have a CAT scan if she does not have to get contrast.  Patient reports that she does not like to take any medications and was fearful about putting contrast into her body. Patient has never received IV contrast and denies ever having any type of an allergic reaction. Patient explained the lack of detail and imaging without obtaining it without IV contrast patient made aware of the possibility of having to repeat the imaging due to not properly seeing organs and being able to obtain the proper and appropriate diagnosis. Patient still insistent on getting the CAT scan without dye and does not want any IV at all. Despite education we will at least obtain CT abdomen pelvis without contrast.  CT abdomen pelvis without contrast obtained and it does show a 3 cm thick-walled cystic structure in the right pelvis. Differential diagnosis includes adnexal cyst versus tubular ovarian abscess versus abscess of other etiology. The appendix is obscured by the structure and appendicitis cannot be fully ruled out. No free air or any significant free fluid noted. Patient was made aware of these findings and once again spent over 20 minutes explaining to patient the importance of obtaining the CAT scan with contrast for us to look for a abscess. Patient initially reluctant but is now agreeable. Patient will have CT scan abdomen pelvis not with contrast.  CT abdomen pelvis with contrast resulted showing an acute inflammatory process suspected in the right lower quadrant the appendix is still not identified this does not exclude perforated appendicitis differential diagnosis includes PID with possible right tubo-ovarian abscess versus adnexal cysts this does not appear readily amendable to a percutaneous drainage. Patient made aware of findings plan will be to consult general surgery. I did speak with Dr. Sim Byers. He was made aware of patient's presenting symptoms as well as abnormal CAT scan. Plan will be for general surgery resident to evaluate patient. Wet prep completed, negative for trichomonas negative for yeast negative for clue cells.   GC and Chlamydia will be pending. Will initiate IV antibiotic treatment regimen with Rocephin, Flagyl and doxycycline. Blood cultures x2 also obtained prior to IV administration. Concerns for perforated appendicitis versus tubo-ovarian abscess. Will need a pelvic ultrasound in the a.m. Plan will be for admission. I did speak with Stephanie Bingham who is covering for Dr. Olesya Serna, he is agreeable to admit patient medical surgical inpatient. He was made aware that an ultrasound will be pending. Patient expressed understanding for admission as well. Patient resting comfortably at this time. Counseling: The emergency provider has spoken with the patient and discussed todays results, in addition to providing specific details for the plan of care and counseling regarding the diagnosis and prognosis. Questions are answered at this time and they are agreeable with the plan.      --------------------------------- IMPRESSION AND DISPOSITION ---------------------------------    IMPRESSION  1. Right lower quadrant abdominal abscess ([address]    2. Periumbilical abdominal pain    3. Urinary tract infection with hematuria, site unspecified        DISPOSITION  Disposition: Admit to med/surg floor  Patient condition is good      NOTE: This report was transcribed using voice recognition software. Every effort was made to ensure accuracy; however, inadvertent computerized transcription errors may be present      NATHALY Pan - CNP  07/29/22 0431  ATTENDING PROVIDER ATTESTATION:     I have personally performed and/or participated in the history, exam, medical decision making, and procedures and agree with all pertinent clinical information. I have also reviewed and agree with the past medical, family and social history unless otherwise noted. My findings/Plan: Patient present here because of ongoing abdominal pain for the last 10 days. Patient reporting some nausea.   She reports some episodes of diarrhea she reports no new vaginal bleeding or discharge. Patient reports she is perimenopausal.  Patient reporting no chest pain or difficulty breathing. Patient reporting no loss of appetite. Patient on exam here is tender mainly lower periumbilical region. Patient's pelvic exam was done by nurse practitioners please see above. Patient neurologically intact. Labs noted reviewed CT was done initially without contrast and then CT was then done with IV contrast initially the patient did not want contrast to be given but due to the findings CT was ordered with contrast shows inflammatory changes appendix was not visualized. And there was some concern also for possible tubo-ovarian abscess. Patient was medicated here in the emergency department. Patient was made aware of findings general surgery was consulted. They did order an ultrasound over the pelvic region for the morning. With patient having these findings and possible appendicitis versus tubo-ovarian abscess plan will be to admit. We did speak to on-call internal medicine. Patient will be admitted to general floor.        Michael Coyle MD  07/29/22 Twan Menjivar MD  07/29/22 3697

## 2022-07-29 NOTE — PROGRESS NOTES
Patient seen and evaluated this morning. Martita Lewis for diet as tolerated from general surgery standpoint. No surgical intervention at this time. Please recall for further questions or concerns.

## 2022-07-29 NOTE — CARE COORDINATION
7/29 Care Coordination. Patient was admit from ED today for abdominal pain, nausea, and diarrhea. CT Abdomen/Pelvis revealed suspected acute inflammatory process in RLQ. US Pelvis pending. [address] consulted, no plans for surgical intervention, f/u OBGYN. UA positive. Receiving Flagyl IV Q8H, Doxycyline IV Q12H, Rocephin IV Q24H and NS @ 75 mL/hr. Attempted to meet patient at bedside to discuss transition of care planning but she was asleep and would not arouse to speak with CM. Patient's RR was >12 and was snoring when trying to wake her. Will attempt to meet at a later time but per documentation, patient appears to be independent and no needs are noted at this time.      XU FloresN, RN  PHYSICIANS [facility] SURGICAL [facility] Case Management   Cell: [phone]

## 2022-07-29 NOTE — CONSULTS
GENERAL SURGERY  CONSULT NOTE  7/29/2022    Physician Consulted: Dr. Poonam Walsh  Reason for Consult: abdominal pain  Referring Physician: Dr. Bell OLSON  Rody Gaxiola is a 49 y.o. female who presents to the general surgery service for evaluation of abdominal pain. The patient is complaining of 11 to 12 days of persistent pain centered around her umbilicus. She denies any pain in her RLQ. She also complains of dyspareunia. Throughout this time, she denies any fevers, chills, nausea, or changes to her bowel habits. She has been able to tolerate a regular diet. She has never experienced these symptoms before    2 months ago, the patient was treated for chlamydia. She states that her boyfriend also tested positive for chlamydia, but it is unclear whether he received treatment. She is currently perimenopausal, and has noticed dark brown vaginal drainage, but states that she was reassured that this was normal by her OB/GYN, Dr. Mallory Patterson. She denies other medical problems or prescription medication use. She smokes 3/4 pack of cigarettes daily, and drinks a rare glass of wine socially. She denies other recreational substance use. She has had no prior abdominal surgery    Since presentation, the patient has been initiated on doxycycline, Flagyl, and Rocephin. A complete pelvic ultrasound has been ordered. Urinalysis is positive for WBCs and bacteria        Past Medical History:   Diagnosis Date    Eczema        Past Surgical History:   Procedure Laterality Date    BREAST LUMPECTOMY      BUNIONECTOMY         Medications Prior to Admission:    Prior to Admission medications    Medication Sig Start Date End Date Taking?  Authorizing Provider   EPINEPHrine (EPIPEN 2-PARKER) 0.3 MG/0.3ML SOAJ injection Inject 0.3 mLs into the muscle once as needed (anaphylaxis) Use as directed for allergic reaction 6/17/21 6/17/21  [facility] Officer Chetan, DO       Allergies   Allergen Reactions    Benadryl [Diphenhydramine] Palpitations Potassium-Containing Compounds Rash     Head to toe rash with IV potassium    Reglan [Metoclopramide] Palpitations       History reviewed. No pertinent family history. Social History     Tobacco Use    Smoking status: Every Day     Packs/day: 0.50     Types: Cigarettes    Smokeless tobacco: Never   Vaping Use    Vaping Use: Never used   Substance Use Topics    Alcohol use: No     Comment: occasionally    Drug use: No         Review of Systems   Constitutional:  Negative for appetite change, chills, fatigue and fever. HENT:  Negative for facial swelling and trouble swallowing. Respiratory:  Negative for cough and shortness of breath. Cardiovascular:  Negative for chest pain and palpitations. Gastrointestinal:  Positive for abdominal pain. Negative for constipation, diarrhea, nausea and vomiting. Endocrine: Negative for polydipsia and polyphagia. Genitourinary:  Positive for dyspareunia and vaginal discharge. Negative for difficulty urinating and dysuria. Skin:  Negative for color change and rash. Neurological:  Negative for dizziness and weakness. Psychiatric/Behavioral:  Negative for agitation, behavioral problems and confusion. PHYSICAL EXAM:    Vitals:    07/29/22 0232   BP: 138/70   Pulse: 99   Resp: 16   Temp:    SpO2: 98%       General Appearance:  awake, alert, oriented, in no acute distress  Skin:  Skin color, texture, turgor normal. No rashes or lesions. Head/face:  NCAT  Eyes:  No gross abnormalities. Sclera nonicteric  Lungs/Chest:  Normal expansion. No respiratory distress. On room air. No chest wall tenderness  Heart: Warm throughout. Regular rate   Abdomen:  Soft, mild periumbilical tenderness, none distended. Mild RLQ pain to very deep palpation. Aorta is palpable. Extremities: Extremities warm to touch, pink, with no edema. Pelvic exam done by ED healthcare professional: No cervical motion or adnexal tenderness.   Dark brown discharge      LABS:    CBC  Recent

## 2022-07-30 NOTE — PROGRESS NOTES
CLINICAL PHARMACY NOTE: MEDS TO BEDS    Total # of Prescriptions Filled: 2   The following medications were delivered to the patient:  Ciprofloxacin 500 mg  Flagyl 500 mg       Additional Documentation:

## 2022-07-31 LAB
ORGANISM: ABNORMAL
URINE CULTURE, ROUTINE: ABNORMAL

## 2022-08-01 LAB
C TRACH DNA GENITAL QL NAA+PROBE: ABNORMAL
N. GONORRHOEAE DNA: ABNORMAL
SOURCE: ABNORMAL

## 2022-08-03 LAB
BLOOD CULTURE, ROUTINE: NORMAL
CULTURE, BLOOD 2: NORMAL

## 2022-08-11 NOTE — DISCHARGE SUMMARY
HISTORY: ORDERING SYSTEM PROVIDED HISTORY: mid abd pain TECHNOLOGIST PROVIDED HISTORY: Reason for exam:->mid abd pain Additional Contrast?->None Decision Support Exception - unselect if not a suspected or confirmed emergency medical condition->Emergency Medical Condition (MA) What reading provider will be dictating this exam?->CRC FINDINGS: Lower Chest: No acute abnormality. Organs: Left renal atrophy/scarring, with associated parenchymal calcification. Upper limits of normal splenic size at 13 cm. Otherwise unremarkable. GI/Bowel: No free air or bowel obstruction. No significant free fluid. Nonvisualized appendix, which is obscured in the right lower quadrant by a thick-walled cystic structure described below. Pelvis: There is a thick-walled cystic structure in the right lower quadrant/right pelvis which measures roughly 3 cm x 3 cm x 3 cm (series 2, image 156 and series 4, image 65). Bladder wall thickening is nonspecific given under distension. Peritoneum/Retroperitoneum: No abdominal aortic aneurysm. Mild atherosclerotic disease. Bones/Soft Tissues: No acute abnormality. ACUTE FINDINGS: There is a 3 cm thick wall cystic structure in the right pelvis. Differential diagnosis includes adnexal cyst, tubo-ovarian abscess, or abscess of other etiology. The appendix is obscured by this structure, and appendicitis cannot be definitively excluded. No free air or significant free fluid. Recommend pelvic ultrasound for further evaluation. Bladder wall thickening is nonspecific given under distension; recommend correlation with urinalysis to evaluate for UTI. INCIDENTAL FINDINGS: Atrophic left kidney. Upper limits of normal splenic size. Mild atherosclerotic disease. RECOMMENDATIONS: 3 cm right ovarian indeterminate cyst. Recommend prompt follow-up with pelvic US.  Reference: Radiology 2010 Sep;256(3):943-54     US PELVIS COMPLETE    Result Date: 7/29/2022  EXAMINATION: PELVIC ULTRASOUND 7/29/2022 TECHNIQUE: Transabdominal pelvic ultrasound duplex ultrasound using B-mode/gray scaled imaging, Doppler spectral analysis and color flow Doppler was obtained. COMPARISON: Correlation is made with prior CT studies of the abdomen and pelvis performed today and yesterday. HISTORY: ORDERING SYSTEM PROVIDED HISTORY: evalutate for tubo-ovarian abscess TECHNOLOGIST PROVIDED HISTORY: Reason for exam:->evalutate for tubo-ovarian abscess What reading provider will be dictating this exam?->CRC FINDINGS: Measurements: Uterus: 9.1 cm in length by 4.4 cm in AP diameter by 5.1 cm in width Endometrial stripe: 1.3 cm Right Ovary:4.3 x 5.1 x 4.6 cm Left Ovary: 3.0 x 2.7 x 2.0 cm Ultrasound Findings: Uterus: Uterus demonstrates normal myometrial echotexture. Endometrial stripe: Endometrial stripe is within normal limits. Right Ovary: The right ovary is mildly enlarged. There is a complex cyst in the right ovary. It contains irregular internal septations. This corresponds to the finding on the CT study. Color evaluation reveals no evidence of hyperemia or internal flow to the complex cyst.  This may very well represent a hemorrhagic cyst.  Also in the differential diagnosis is tubo-ovarian abscess, given its appearance on CT study. Further evaluation needs to be based on clinical grounds. Transvaginal pelvic ultrasound can be considered for further, more detailed evaluation. Color Doppler and spectral wave analysis of the right ovary reveals normal arterial and venous phasicity and flow. Left Ovary:  Left ovary is within normal limits. There is normal arterial and venous Doppler flow. Free Fluid: No evidence of free fluid. 1.  Complex right ovarian cyst, as described above. This may very well represent a hemorrhagic cyst.  Also in the differential diagnosis is tubo-ovarian abscess, given its appearance on CT study performed today and yesterday. Further evaluation needs to be based on clinical grounds.  Transvaginal pelvic ultrasound can be considered for further, more detailed evaluation. RECOMMENDATIONS: Unavailable     CT ABDOMEN PELVIS W IV CONTRAST Additional Contrast? None    Result Date: 7/29/2022  EXAMINATION: CT OF THE ABDOMEN AND PELVIS WITH CONTRAST 7/29/2022 1:34 am TECHNIQUE: CT of the abdomen and pelvis was performed with the administration of intravenous contrast. Multiplanar reformatted images are provided for review. Automated exposure control, iterative reconstruction, and/or weight based adjustment of the mA/kV was utilized to reduce the radiation dose to as low as reasonably achievable. COMPARISON: None. HISTORY: ORDERING SYSTEM PROVIDED HISTORY: Mid abdominal pain TECHNOLOGIST PROVIDED HISTORY: Reason for exam:->Abnormal dry CAT scan concerning for abscess versus appendicitis to the right lower quadrant region. Additional Contrast?->None Decision Support Exception - unselect if not a suspected or confirmed emergency medical condition->Emergency Medical Condition (MA) What reading provider will be dictating this exam?->CRC FINDINGS: Lower Chest: Lung bases are clear. No effusion. Organs: Liver, gallbladder, spleen, pancreas, kidneys and adrenals demonstrate no acute abnormality. Moderate atrophy of the left kidney with parenchymal calcifications versus nephrolithiasis. GI/Bowel: No abnormal dilatation or appreciable wall thickening. The appendix is not identified. Pelvis: Urinary bladder unremarkable given the lack of distention. 2.8 x 2.6 cm thick-walled cystic structure in the right adnexal region. Edema noted within the extraperitoneal fat anterior to the uterus. Peritoneum/Retroperitoneum: Otherwise, no discrete fluid collection identified. No retroperitoneal adenopathy. Abdominal aorta is normal in caliber. Bones/Soft Tissues: No acute soft tissue abnormality. No aggressive osseous lesions. Acute inflammatory process suspected in the right lower quadrant. The appendix is not identified.   This does not Matteo, DO in 1 week.   Follow-up with consultants as recommended by them    Note that over 30 minutes was spent in preparing discharge papers, discussing discharge with patient, medication review, etc.    Signed:  Colton Garcia MD  7/29/2022

## 2022-09-11 NOTE — ED NOTES
ED note addendum added to patient's chart from visit on July 28, 2022  EKG interpretation EKG interpreted by myself as well as the emergency room attending, showing a heart rate of 117, sinus tachycardia with PACs. No other EKGs noted for comparison. Please add to chart from patient's emergency room visit on July 28, 2022. Issagiovannacheikh Hilda, NATHALY - CNP  09/11/22 1819  ATTENDING PROVIDER ATTESTATION:     I have personally performed and/or participated in the history, exam, medical decision making, and procedures and agree with all pertinent clinical information. I have also reviewed and agree with the past medical, family and social history unless otherwise noted. My findings/Plan: This addendum was for EKG findings.   Please see rest of history and physical.       Álvaro Taylor MD  09/12/22 2010

## 2022-11-21 ENCOUNTER — HOSPITAL ENCOUNTER (EMERGENCY)
Age: 47
Discharge: HOME | End: 2022-11-21

## 2022-11-21 VITALS
RESPIRATION RATE: 16 BRPM | HEART RATE: 108 BPM | BODY MASS INDEX: 18.78 KG/M2 | DIASTOLIC BLOOD PRESSURE: 89 MMHG | OXYGEN SATURATION: 99 % | TEMPERATURE: 98.2 F | SYSTOLIC BLOOD PRESSURE: 155 MMHG | WEIGHT: 106 LBS

## 2022-11-21 DIAGNOSIS — Z20.2 EXPOSURE TO STD: Primary | ICD-10-CM

## 2022-11-21 LAB
BACTERIA: ABNORMAL /HPF
BILIRUBIN URINE: NEGATIVE
BLOOD, URINE: NEGATIVE
CLARITY: ABNORMAL
CLUE CELLS: NORMAL
COLOR: YELLOW
EPITHELIAL CELLS, UA: ABNORMAL /HPF
GLUCOSE URINE: NEGATIVE MG/DL
HCG(URINE) PREGNANCY TEST: NEGATIVE
KETONES, URINE: NEGATIVE MG/DL
LEUKOCYTE ESTERASE, URINE: ABNORMAL
MUCUS: PRESENT /LPF
NITRITE, URINE: NEGATIVE
PH UA: 7 (ref 5–9)
PROTEIN UA: NEGATIVE MG/DL
RBC UA: ABNORMAL /HPF (ref 0–2)
SOURCE WET PREP: NORMAL
SPECIFIC GRAVITY UA: 1.01 (ref 1–1.03)
TRICHOMONAS PREP: NORMAL
UROBILINOGEN, URINE: 0.2 E.U./DL
WBC UA: >20 /HPF (ref 0–5)
YEAST WET PREP: NORMAL

## 2022-11-21 PROCEDURE — 87591 N.GONORRHOEAE DNA AMP PROB: CPT

## 2022-11-21 PROCEDURE — 6370000000 HC RX 637 (ALT 250 FOR IP)

## 2022-11-21 PROCEDURE — 87210 SMEAR WET MOUNT SALINE/INK: CPT

## 2022-11-21 PROCEDURE — 2500000003 HC RX 250 WO HCPCS

## 2022-11-21 PROCEDURE — 6360000002 HC RX W HCPCS

## 2022-11-21 PROCEDURE — 81001 URINALYSIS AUTO W/SCOPE: CPT

## 2022-11-21 PROCEDURE — 81025 URINE PREGNANCY TEST: CPT

## 2022-11-21 PROCEDURE — 96372 THER/PROPH/DIAG INJ SC/IM: CPT

## 2022-11-21 PROCEDURE — 99284 EMERGENCY DEPT VISIT MOD MDM: CPT

## 2022-11-21 PROCEDURE — 87491 CHLMYD TRACH DNA AMP PROBE: CPT

## 2022-11-21 RX ORDER — AZITHROMYCIN 250 MG/1
1000 TABLET, FILM COATED ORAL ONCE
Status: COMPLETED | OUTPATIENT
Start: 2022-11-21 | End: 2022-11-21

## 2022-11-21 RX ADMIN — AZITHROMYCIN MONOHYDRATE 1000 MG: 250 TABLET ORAL at 18:11

## 2022-11-21 RX ADMIN — LIDOCAINE HYDROCHLORIDE 500 MG: 10 INJECTION, SOLUTION EPIDURAL; INFILTRATION; INTRACAUDAL; PERINEURAL at 18:12

## 2022-11-21 ASSESSMENT — LIFESTYLE VARIABLES: HOW OFTEN DO YOU HAVE A DRINK CONTAINING ALCOHOL: NEVER

## 2022-11-21 ASSESSMENT — PAIN - FUNCTIONAL ASSESSMENT: PAIN_FUNCTIONAL_ASSESSMENT: NONE - DENIES PAIN

## 2022-11-21 NOTE — ED PROVIDER NOTES
[facility]  Department of Emergency Medicine   ED  Encounter Note  Admit Date/RoomTime: 2022  4:47 PM  ED Room:     NAME: Jaja Valentine  : 1975  MRN: [id number]     Chief Complaint:  Exposure to STD (Was exposed to std, cloudy urine, no discharge)    History of Present Illness      Jaja Valentine is a 49 y.o. old female who presents to the emergency department by private vehicle, for known exposure to Chlamydia and gonorrhea with cloudy urine, which occured 2 day(s) prior to arrival.  Since exposure/onset there has been no developing symptoms. She denies any abdominal pain, back pain, chills, diarrhea, dysuria, hematuria, urinary frequency, urinary incontinence, urinary urgency, vaginal discharge, and vaginal itching. Her prior STD history: chlamydia and trichomoniasis. No LMP recorded. Patient is perimenopausal.. No obstetric history on file. Patient stated that about 6 months ago she was treated for chlamydia and trichomonas from her ex. She states that she had told her current partner, and he was post to get tested. She states that a couple days ago he was having difficulty urinating so he went to the doctors, and was diagnosed with chlamydia and gonorrhea. She states that he had forgotten to get tested 6 months ago. Patient states that the only symptoms she has had has noticed that her urine is cloudy. She denies any pain or burning upon urination. Denies any fevers or chills. Patient is ambulatory to the emergency department unremarkable nontoxic in no acute distress. No Other complaints or concerns at this time. ROS   Pertinent positives and negatives are stated within HPI, all other systems reviewed and are negative. Past Medical History:  has a past medical history of Eczema. Surgical History:  has a past surgical history that includes Bunionectomy and Breast lumpectomy.     Social History:  reports that she has been smoking cigarettes. She has been smoking an average of .5 packs per day. She has never used smokeless tobacco. She reports that she does not drink alcohol and does not use drugs. Family History: family history is not on file. Allergies: Benadryl [diphenhydramine], Potassium-containing compounds, and Reglan [metoclopramide]    Physical Exam   Oxygen Saturation Interpretation: Normal.        ED Triage Vitals   BP Temp Temp Source Heart Rate Resp SpO2 Height Weight   11/21/22 1639 11/21/22 1639 11/21/22 1639 11/21/22 1639 11/21/22 1639 11/21/22 1639 -- 11/21/22 1644   (!) 155/89 98.2 °F (36.8 °C) Oral (!) 108 16 99 %  106 lb (48.1 kg)         Constitutional:  Alert, development consistent with age. HEENT:  NC/NT. Airway patent  Neck:  Normal ROM. Supple. Respiratory:  Lungs Clear to auscultation and breath sounds equal.  CV:  Regular rate and rhythm, normal heart sounds, without pathological murmurs, ectopy, gallops, or rubs. GI:  AbdomenSoft, nontender, good bowel sounds. No firm or pulsatile mass. : Pelvic Exam: (Same sex / third party chaperone present during examination). External Genitalia: General appearance; normal, Hair distribution; normal, Lesions absent. Urethral Meatus: Size normal, Location normal, Lesions absent, Prolapse absent. Vagina: no abnormal discharge or lesions and Wet Prep/KOH no pathogens. Cervix: not indicated, normal appearing cervix without discharge or lesions, and cervical motion tenderness absent. Uterus:  normal.       Adenexa: no tenderness bilaterally. Anus/Perineum:  normal.  Integument:  Normal turgor. Warm, dry, without visible rash, unless noted elsewhere. Lymphatics: No lymphangitis or adenopathy noted. Neurological:  Orientation age-appropriate. Motor functions intact.     Lab / Imaging Results   (All laboratory and radiology results have been personally reviewed by myself)  Labs:  Results for orders placed or performed during the hospital encounter of 11/21/22   Wet prep, genital    Specimen: Vaginal   Result Value Ref Range    Trichomonas Prep None Seen     Yeast, Wet Prep None Seen     Clue Cells, Wet Prep None Seen     Source Wet Prep VAGINAL    C.trachomatis N.gonorrhoeae DNA    Specimen: Vaginal   Result Value Ref Range    Source Endocervix    Urinalysis with Microscopic   Result Value Ref Range    Color, UA Yellow Straw/Yellow    Clarity, UA SL CLOUDY Clear    Glucose, Ur Negative Negative mg/dL    Bilirubin Urine Negative Negative    Ketones, Urine Negative Negative mg/dL    Specific Gravity, UA 1.015 1.005 - 1.030    Blood, Urine Negative Negative    pH, UA 7.0 5.0 - 9.0    Protein, UA Negative Negative mg/dL    Urobilinogen, Urine 0.2 <2.0 E.U./dL    Nitrite, Urine Negative Negative    Leukocyte Esterase, Urine SMALL (A) Negative   Pregnancy, urine   Result Value Ref Range    HCG(Urine) Pregnancy Test NEGATIVE NEGATIVE     Imaging: All Radiology results interpreted by Radiologist unless otherwise noted. No orders to display     ED Course / Medical Decision Making     Medications   cefTRIAXone (ROCEPHIN) 500 mg in lidocaine 1 % 1.43 mL IM Injection (500 mg IntraMUSCular Given 11/21/22 1812)   azithromycin (ZITHROMAX) tablet 1,000 mg (1,000 mg Oral Given 11/21/22 1811)        Consult(s):   None    Procedure(s):   None    Medical Decision Making:    Patient is a 25-year-old female presents to the emergency department for known exposure to chlamydia and gonorrhea. Patient was treated 6 months ago for the same STDs. Patient's current partner was not treated, and just tested +2 days ago for chlamydia and gonorrhea. She denies abdominal pain, back pain, fever or chills. Only symptom is cloudy urine. Vaginal exam was performed. Patient had no vaginal discharge, cervical motion tenderness or adnexal tenderness upon exam.  Patient was treated prophylactically for chlamydia and gonorrhea. Patient's wet prep showed no pathogens. Patient advised to abstain from sex intercourse for the next 7 to 10 days. Urinalysis showed no evidence of a urinary tract infection. The spine discussed with the patient, she verbalized understanding. Patient be treated prophylactically for gonorrhea and chlamydia. Plan to obtain cultures and treat for suspected STD and provide outpatient follow-up instructions. Plan is to discharge, the patient management follow-up with PCP. Patient is agreeable to this plan. At this time the patient is without objective evidence of an acute process requiring hospitalization or inpatient management. They have remained hemodynamically stable throughout their entire ED visit and are stable for discharge with outpatient follow-up. The plan has been discussed in detail and they are aware of the specific conditions for emergent return, as well as the importance of follow-up. Plan of Care/Counseling:  NATHALY Hand CNP reviewed today's visit with the patient in addition to providing specific details for the plan of care and counseling regarding the diagnosis and prognosis. Questions are answered at this time and are agreeable with the plan. Assessment     1. Exposure to STD      Plan   Discharged home. Patient condition is good    New Medications     New Prescriptions    No medications on file     Electronically signed by NATHALY Hand CNP   DD: 11/21/22  **This report was transcribed using voice recognition software. Every effort was made to ensure accuracy; however, inadvertent computerized transcription errors may be present.   END OF ED PROVIDER NOTE      NATHALY Hand CNP  11/21/22 3131

## 2022-11-25 LAB
C TRACH DNA GENITAL QL NAA+PROBE: NEGATIVE
N. GONORRHOEAE DNA: NEGATIVE
SOURCE: NORMAL

## 2023-05-01 ENCOUNTER — HOSPITAL ENCOUNTER (EMERGENCY)
Age: 48
Discharge: HOME | End: 2023-05-01
Attending: EMERGENCY MEDICINE
Payer: COMMERCIAL

## 2023-05-01 ENCOUNTER — APPOINTMENT (OUTPATIENT)
Dept: GENERAL RADIOLOGY | Age: 48
End: 2023-05-01
Payer: COMMERCIAL

## 2023-05-01 VITALS
WEIGHT: 105 LBS | OXYGEN SATURATION: 97 % | HEART RATE: 98 BPM | BODY MASS INDEX: 18.61 KG/M2 | TEMPERATURE: 98.1 F | SYSTOLIC BLOOD PRESSURE: 137 MMHG | DIASTOLIC BLOOD PRESSURE: 70 MMHG | RESPIRATION RATE: 16 BRPM | HEIGHT: 63 IN

## 2023-05-01 DIAGNOSIS — S53.402A ELBOW SPRAIN, LEFT, INITIAL ENCOUNTER: Primary | ICD-10-CM

## 2023-05-01 PROCEDURE — 73080 X-RAY EXAM OF ELBOW: CPT

## 2023-05-01 PROCEDURE — 99283 EMERGENCY DEPT VISIT LOW MDM: CPT

## 2023-05-01 ASSESSMENT — PAIN DESCRIPTION - PAIN TYPE: TYPE: ACUTE PAIN

## 2023-05-01 ASSESSMENT — PAIN SCALES - GENERAL: PAINLEVEL_OUTOF10: 0

## 2023-05-01 ASSESSMENT — PAIN - FUNCTIONAL ASSESSMENT: PAIN_FUNCTIONAL_ASSESSMENT: 0-10

## 2023-05-01 ASSESSMENT — PAIN DESCRIPTION - LOCATION: LOCATION: ELBOW

## 2023-05-01 ASSESSMENT — PAIN DESCRIPTION - ORIENTATION: ORIENTATION: LEFT

## 2023-05-01 NOTE — ED PROVIDER NOTES
[address] ENCOUNTER        Pt Name: Arlette Grissom  MRN: [id number]  [place] 1975  Date of evaluation: 5/1/2023  Provider: Ale Ferrari DO  PCP: No primary care provider on file. Note Started: 9:56 AM EDT 5/1/23    CHIEF COMPLAINT       Chief Complaint   Patient presents with    Elbow Pain     Pt lifted a box at work 6 days ago and still having pain in left elbow       HISTORY OF PRESENT ILLNESS: 1 or more Elements   History From: patient    Limitations to history : None    Arlette Grissom is a 51 y.o. female who presents with left elbow pain beginning 6 days ago after lifting a box at work. Left-handed. She denies paresthesias or weakness but has pain in the elbow radiating up and down the arm with movement. She denies direct injury but states she has been \"throwing\" heavy boxes all day before this injury occurred. She has been working the past week with continued pain. The complaint has been persistent, moderate in severity, and worsened by movement. Patient denies any other complaints at this time. Nursing Notes were all reviewed and agreed with or any disagreements were addressed in the HPI. REVIEW OF SYSTEMS :           Positives and Pertinent negatives as per HPI. SURGICAL HISTORY     Past Surgical History:   Procedure Laterality Date    BREAST LUMPECTOMY      BUNIONECTOMY         CURRENTMEDICATIONS       There are no discharge medications for this patient. ALLERGIES     Benadryl [diphenhydramine], Potassium-containing compounds, and Reglan [metoclopramide]    FAMILYHISTORY     History reviewed. No pertinent family history. SOCIAL HISTORY       Social History     Tobacco Use    Smoking status: Every Day     Packs/day: 0.50     Types: Cigarettes    Smokeless tobacco: Never   Vaping Use    Vaping Use: Never used   Substance Use Topics    Alcohol use: No     Comment: occasionally    Drug use:  No